# Patient Record
Sex: FEMALE | Race: WHITE | NOT HISPANIC OR LATINO | Employment: FULL TIME | URBAN - METROPOLITAN AREA
[De-identification: names, ages, dates, MRNs, and addresses within clinical notes are randomized per-mention and may not be internally consistent; named-entity substitution may affect disease eponyms.]

---

## 2022-04-10 ENCOUNTER — HOSPITAL ENCOUNTER (EMERGENCY)
Facility: CLINIC | Age: 22
Discharge: HOME | End: 2022-04-10
Attending: EMERGENCY MEDICINE | Admitting: EMERGENCY MEDICINE
Payer: COMMERCIAL

## 2022-04-10 ENCOUNTER — APPOINTMENT (OUTPATIENT)
Dept: GENERAL RADIOLOGY | Facility: CLINIC | Age: 22
End: 2022-04-10
Attending: EMERGENCY MEDICINE
Payer: COMMERCIAL

## 2022-04-10 VITALS
SYSTOLIC BLOOD PRESSURE: 114 MMHG | HEART RATE: 69 BPM | TEMPERATURE: 97.2 F | DIASTOLIC BLOOD PRESSURE: 70 MMHG | RESPIRATION RATE: 16 BRPM | WEIGHT: 140 LBS | OXYGEN SATURATION: 100 %

## 2022-04-10 DIAGNOSIS — S42.021A CLOSED DISPLACED FRACTURE OF SHAFT OF RIGHT CLAVICLE, INITIAL ENCOUNTER: ICD-10-CM

## 2022-04-10 PROCEDURE — 73030 X-RAY EXAM OF SHOULDER: CPT | Mod: RT

## 2022-04-10 PROCEDURE — 23500 CLTX CLAVICULAR FX W/O MNPJ: CPT | Mod: 54 | Performed by: EMERGENCY MEDICINE

## 2022-04-10 PROCEDURE — 99284 EMERGENCY DEPT VISIT MOD MDM: CPT | Mod: 25 | Performed by: EMERGENCY MEDICINE

## 2022-04-10 PROCEDURE — 250N000013 HC RX MED GY IP 250 OP 250 PS 637: Performed by: EMERGENCY MEDICINE

## 2022-04-10 PROCEDURE — 23500 CLTX CLAVICULAR FX W/O MNPJ: CPT | Mod: RT | Performed by: EMERGENCY MEDICINE

## 2022-04-10 RX ORDER — OXYCODONE HYDROCHLORIDE 5 MG/1
5 TABLET ORAL ONCE
Status: COMPLETED | OUTPATIENT
Start: 2022-04-10 | End: 2022-04-10

## 2022-04-10 RX ORDER — ACETAMINOPHEN 325 MG/1
975 TABLET ORAL ONCE
Status: COMPLETED | OUTPATIENT
Start: 2022-04-10 | End: 2022-04-10

## 2022-04-10 RX ORDER — IBUPROFEN 600 MG/1
600 TABLET, FILM COATED ORAL EVERY 8 HOURS PRN
Qty: 20 TABLET | Refills: 0 | Status: SHIPPED | OUTPATIENT
Start: 2022-04-10 | End: 2022-04-12

## 2022-04-10 RX ORDER — OXYCODONE HYDROCHLORIDE 5 MG/1
5 TABLET ORAL EVERY 6 HOURS PRN
Qty: 12 TABLET | Refills: 0 | Status: SHIPPED | OUTPATIENT
Start: 2022-04-10 | End: 2022-04-12

## 2022-04-10 RX ADMIN — OXYCODONE HYDROCHLORIDE 5 MG: 5 TABLET ORAL at 16:26

## 2022-04-10 RX ADMIN — ACETAMINOPHEN 975 MG: 325 TABLET, FILM COATED ORAL at 16:25

## 2022-04-10 ASSESSMENT — ENCOUNTER SYMPTOMS
NECK PAIN: 0
DIFFICULTY URINATING: 0
MYALGIAS: 1
CONFUSION: 0
ABDOMINAL PAIN: 0
ARTHRALGIAS: 1
COLOR CHANGE: 0
HEADACHES: 0
SHORTNESS OF BREATH: 0
EYE REDNESS: 0
NECK STIFFNESS: 0
FEVER: 0

## 2022-04-10 NOTE — ED TRIAGE NOTES
Patient Fell around 2 pm, patient also reports hitting her head, no change in LOC. Patient reports R arm tingling.

## 2022-04-10 NOTE — ED PROVIDER NOTES
ED Provider Note  [facility]      History     Chief Complaint   Patient presents with     Shoulder Injury     Patient roller bladding and fell on her Right Side     The history is provided by the patient and medical records.     Min Anthony is a 21 year old female presenting to the ED via EMS with right shoulder pain. Patient reports she fell onto her right side while roller blading just prior to arrival. She hit her head, was not wearing a helmet, but denies headache. She reports right mid clavicle pain. She is unable to move her right arm. She endorses tingling in the right arm. She took 800 mg ibuprofen prior to arrival.    Past Medical History  History reviewed. No pertinent past medical history.  History reviewed. No pertinent surgical history.  ibuprofen (ADVIL/MOTRIN) 600 MG tablet  oxyCODONE (ROXICODONE) 5 MG tablet      No Known Allergies  Family History  No family history on file.  Social History   Social History     Tobacco Use     Smoking status: Never Smoker     Smokeless tobacco: Never Used   Substance Use Topics     Alcohol use: Yes     Comment: socially     Drug use: Not Currently      Past medical history, past surgical history, medications, allergies, family history, and social history were reviewed with the patient. No additional pertinent items.       Review of Systems   Constitutional: Negative for fever.   HENT: Negative for congestion.    Eyes: Negative for redness.   Respiratory: Negative for shortness of breath.    Cardiovascular: Negative for chest pain.   Gastrointestinal: Negative for abdominal pain.   Genitourinary: Negative for difficulty urinating.   Musculoskeletal: Positive for arthralgias and myalgias. Negative for neck pain and neck stiffness.        Right mid clavicle pain     Skin: Negative for color change.   Neurological: Negative for headaches.   Psychiatric/Behavioral: Negative for confusion.   All other systems reviewed and are negative.    A  complete review of systems was performed with pertinent positives and negatives noted in the HPI, and all other systems negative.    Physical Exam   BP: 114/70  Pulse: 69  Temp: 97.2  F (36.2  C)  Resp: 16  Weight: 63.5 kg (140 lb)  SpO2: 100 %  Physical Exam  Vitals and nursing note reviewed.   Constitutional:       General: She is in acute distress.   HENT:      Head: Atraumatic.   Cardiovascular:      Rate and Rhythm: Normal rate and regular rhythm.   Musculoskeletal:      Right shoulder: Swelling, deformity, tenderness and bony tenderness present. Decreased range of motion.        Arms:       Cervical back: Neck supple.      Comments: Pain swelling and tenderness over the right clavicle, no skin defect.   Neurological:      Mental Status: She is alert.         ED Course      Procedures       XR Shoulder Right 3 Views   Preliminary Result   IMPRESSION:   1.  Acute comminuted fracture of the right clavicle diaphysis (middle third). There is 17 mm of superior displacement of the proximal fragment in relationship to the distal fragment. Moderate-sized vertically orientated butterfly fragment.   2.  No joint malalignment.        Medications   acetaminophen (TYLENOL) tablet 975 mg (975 mg Oral Given 4/10/22 1625)   oxyCODONE (ROXICODONE) tablet 5 mg (5 mg Oral Given 4/10/22 1626)            Results for orders placed or performed during the hospital encounter of 04/10/22   XR Shoulder Right 3 Views     Status: None (Preliminary result)    Narrative    EXAM: XR SHOULDER RIGHT G/E 3 VIEWS  LOCATION: [facility]  DATE/TIME: 4/10/2022 4:42 PM    INDICATION: Right clavicle pain after a fall.  COMPARISON: None.      Impression    IMPRESSION:  1.  Acute comminuted fracture of the right clavicle diaphysis (middle third). There is 17 mm of superior displacement of the proximal fragment in relationship to the distal fragment. Moderate-sized vertically orientated butterfly  fragment.  2.  No joint malalignment.     Medications   acetaminophen (TYLENOL) tablet 975 mg (975 mg Oral Given 4/10/22 1625)   oxyCODONE (ROXICODONE) tablet 5 mg (5 mg Oral Given 4/10/22 1626)        Assessments & Plan (with Medical Decision Making)   21-year-old right-hand-dominant female fell while rollerblading and sustained a displaced and angulated right midshaft clavicle fracture with a free-floating fragment.  There is no nerve or vascular involvement we will place her in a sling provide pain medications and I will give her the number to the [facility] orthopedic clinic to be followed up this week hopefully I think she will need operative repair of this.  It is a closed fracture    I have reviewed the nursing notes. I have reviewed the findings, diagnosis, plan and need for follow up with the patient.    New Prescriptions    IBUPROFEN (ADVIL/MOTRIN) 600 MG TABLET    Take 1 tablet (600 mg) by mouth as needed in the morning and 1 tablet (600 mg) as needed at noon and 1 tablet (600 mg) as needed in the evening for moderate pain.    OXYCODONE (ROXICODONE) 5 MG TABLET    Take 1 tablet (5 mg) by mouth as needed in the morning and 1 tablet (5 mg) as needed at noon and 1 tablet (5 mg) as needed in the evening and 1 tablet (5 mg) as needed before bedtime for pain.       Final diagnoses:   Closed displaced fracture of shaft of right clavicle, initial encounter   I, Matilde Robert, am serving as a trained medical scribe to document services personally performed by Deondre Fishman MD, based on the provider's statements to me.     I, Deondre Fishman MD, was physically present and have reviewed and verified the accuracy of this note documented by Matilde Robert.      --  Deondre Fishman MD  [facility] EMERGENCY DEPARTMENT  4/10/2022     Deondre Fishman MD  04/10/22 1991

## 2022-04-10 NOTE — DISCHARGE INSTRUCTIONS
Use pain medication as needed  Wear the sling for comfort  Call the orthopedic clinic tomorrow to schedule a follow-up this week  Please make an appointment to follow up with Orthopedics Clinic (phone: [phone]) as soon as possible.

## 2022-04-10 NOTE — ED NOTES
Pt is 22 yo F bib self after falling on R shoulder while roller blading. She c/o pain 10/10, numbness and tingling in her fingers, unable to move arm at this time. Pt provided ice pack. MD notified.

## 2022-04-10 NOTE — H&P (VIEW-ONLY)
ED Provider Note  [facility]      History     Chief Complaint   Patient presents with     Shoulder Injury     Patient roller bladding and fell on her Right Side     The history is provided by the patient and medical records.     Min Anthony is a 21 year old female presenting to the ED via EMS with right shoulder pain. Patient reports she fell onto her right side while roller blading just prior to arrival. She hit her head, was not wearing a helmet, but denies headache. She reports right mid clavicle pain. She is unable to move her right arm. She endorses tingling in the right arm. She took 800 mg ibuprofen prior to arrival.    Past Medical History  History reviewed. No pertinent past medical history.  History reviewed. No pertinent surgical history.  ibuprofen (ADVIL/MOTRIN) 600 MG tablet  oxyCODONE (ROXICODONE) 5 MG tablet      No Known Allergies  Family History  No family history on file.  Social History   Social History     Tobacco Use     Smoking status: Never Smoker     Smokeless tobacco: Never Used   Substance Use Topics     Alcohol use: Yes     Comment: socially     Drug use: Not Currently      Past medical history, past surgical history, medications, allergies, family history, and social history were reviewed with the patient. No additional pertinent items.       Review of Systems   Constitutional: Negative for fever.   HENT: Negative for congestion.    Eyes: Negative for redness.   Respiratory: Negative for shortness of breath.    Cardiovascular: Negative for chest pain.   Gastrointestinal: Negative for abdominal pain.   Genitourinary: Negative for difficulty urinating.   Musculoskeletal: Positive for arthralgias and myalgias. Negative for neck pain and neck stiffness.        Right mid clavicle pain     Skin: Negative for color change.   Neurological: Negative for headaches.   Psychiatric/Behavioral: Negative for confusion.   All other systems reviewed and are negative.    A  complete review of systems was performed with pertinent positives and negatives noted in the HPI, and all other systems negative.    Physical Exam   BP: 114/70  Pulse: 69  Temp: 97.2  F (36.2  C)  Resp: 16  Weight: 63.5 kg (140 lb)  SpO2: 100 %  Physical Exam  Vitals and nursing note reviewed.   Constitutional:       General: She is in acute distress.   HENT:      Head: Atraumatic.   Cardiovascular:      Rate and Rhythm: Normal rate and regular rhythm.   Musculoskeletal:      Right shoulder: Swelling, deformity, tenderness and bony tenderness present. Decreased range of motion.        Arms:       Cervical back: Neck supple.      Comments: Pain swelling and tenderness over the right clavicle, no skin defect.   Neurological:      Mental Status: She is alert.         ED Course      Procedures       XR Shoulder Right 3 Views   Preliminary Result   IMPRESSION:   1.  Acute comminuted fracture of the right clavicle diaphysis (middle third). There is 17 mm of superior displacement of the proximal fragment in relationship to the distal fragment. Moderate-sized vertically orientated butterfly fragment.   2.  No joint malalignment.        Medications   acetaminophen (TYLENOL) tablet 975 mg (975 mg Oral Given 4/10/22 1625)   oxyCODONE (ROXICODONE) tablet 5 mg (5 mg Oral Given 4/10/22 1626)            Results for orders placed or performed during the hospital encounter of 04/10/22   XR Shoulder Right 3 Views     Status: None (Preliminary result)    Narrative    EXAM: XR SHOULDER RIGHT G/E 3 VIEWS  LOCATION: [facility]  DATE/TIME: 4/10/2022 4:42 PM    INDICATION: Right clavicle pain after a fall.  COMPARISON: None.      Impression    IMPRESSION:  1.  Acute comminuted fracture of the right clavicle diaphysis (middle third). There is 17 mm of superior displacement of the proximal fragment in relationship to the distal fragment. Moderate-sized vertically orientated butterfly  fragment.  2.  No joint malalignment.     Medications   acetaminophen (TYLENOL) tablet 975 mg (975 mg Oral Given 4/10/22 1625)   oxyCODONE (ROXICODONE) tablet 5 mg (5 mg Oral Given 4/10/22 1626)        Assessments & Plan (with Medical Decision Making)   21-year-old right-hand-dominant female fell while rollerblading and sustained a displaced and angulated right midshaft clavicle fracture with a free-floating fragment.  There is no nerve or vascular involvement we will place her in a sling provide pain medications and I will give her the number to the [facility] orthopedic clinic to be followed up this week hopefully I think she will need operative repair of this.  It is a closed fracture    I have reviewed the nursing notes. I have reviewed the findings, diagnosis, plan and need for follow up with the patient.    New Prescriptions    IBUPROFEN (ADVIL/MOTRIN) 600 MG TABLET    Take 1 tablet (600 mg) by mouth as needed in the morning and 1 tablet (600 mg) as needed at noon and 1 tablet (600 mg) as needed in the evening for moderate pain.    OXYCODONE (ROXICODONE) 5 MG TABLET    Take 1 tablet (5 mg) by mouth as needed in the morning and 1 tablet (5 mg) as needed at noon and 1 tablet (5 mg) as needed in the evening and 1 tablet (5 mg) as needed before bedtime for pain.       Final diagnoses:   Closed displaced fracture of shaft of right clavicle, initial encounter   I, Matilde Robert, am serving as a trained medical scribe to document services personally performed by Deondre Fishman MD, based on the provider's statements to me.     I, Deondre Fishman MD, was physically present and have reviewed and verified the accuracy of this note documented by Matilde Robert.      --  Deondre Fishman MD  [facility] EMERGENCY DEPARTMENT  4/10/2022     Deondre Fishman MD  04/10/22 [id number]

## 2022-04-11 ENCOUNTER — TELEPHONE (OUTPATIENT)
Dept: ORTHOPEDICS | Facility: CLINIC | Age: 22
End: 2022-04-11

## 2022-04-11 ENCOUNTER — OFFICE VISIT (OUTPATIENT)
Dept: ORTHOPEDICS | Facility: CLINIC | Age: 22
End: 2022-04-11
Payer: COMMERCIAL

## 2022-04-11 ENCOUNTER — PRE VISIT (OUTPATIENT)
Dept: ORTHOPEDICS | Facility: CLINIC | Age: 22
End: 2022-04-11

## 2022-04-11 VITALS — WEIGHT: 140 LBS | HEIGHT: 69 IN | BODY MASS INDEX: 20.73 KG/M2

## 2022-04-11 DIAGNOSIS — S42.021A CLOSED DISPLACED FRACTURE OF SHAFT OF RIGHT CLAVICLE, INITIAL ENCOUNTER: Primary | ICD-10-CM

## 2022-04-11 LAB — SARS-COV-2 RNA RESP QL NAA+PROBE: NEGATIVE

## 2022-04-11 PROCEDURE — U0005 INFEC AGEN DETEC AMPLI PROBE: HCPCS | Performed by: ORTHOPAEDIC SURGERY

## 2022-04-11 PROCEDURE — 99204 OFFICE O/P NEW MOD 45 MIN: CPT | Mod: 57 | Performed by: ORTHOPAEDIC SURGERY

## 2022-04-11 PROCEDURE — U0003 INFECTIOUS AGENT DETECTION BY NUCLEIC ACID (DNA OR RNA); SEVERE ACUTE RESPIRATORY SYNDROME CORONAVIRUS 2 (SARS-COV-2) (CORONAVIRUS DISEASE [COVID-19]), AMPLIFIED PROBE TECHNIQUE, MAKING USE OF HIGH THROUGHPUT TECHNOLOGIES AS DESCRIBED BY CMS-2020-01-R: HCPCS | Performed by: ORTHOPAEDIC SURGERY

## 2022-04-11 NOTE — LETTER
4/11/2022         RE: Min Anthony  [id number] [address]  [place] [address]        Dear Colleague,    Thank you for referring your patient, Min Anthony, to the [facility]. Please see a copy of my visit note below.    CHIEF CONCERN:  Right clavicle fracture    HISTORY OF PRESENT ILLNESS:  Min is a 21 year old RHD woman I am seeing for a displaced and comminuted right clavicle fracture. The patient sustained this injury while rollerblading yesterday. She was seen in the ED and XRs reviewed the fracture. She has had tingling/numbness in her R thumb, index and middle finger since the accident. She has had no trouble with motion of those fingers/hand. She had abrasions on her right hand and the edge of her right shoulder. She has had some elbow pain but has been able to move the elbow in the sling.     PAST MEDICAL HISTORY: Reviewed in EMR    Current Outpatient Medications   Medication Sig Dispense Refill     ibuprofen (ADVIL/MOTRIN) 600 MG tablet Take 1 tablet (600 mg) by mouth as needed in the morning and 1 tablet (600 mg) as needed at noon and 1 tablet (600 mg) as needed in the evening for moderate pain. 20 tablet 0     oxyCODONE (ROXICODONE) 5 MG tablet Take 1 tablet (5 mg) by mouth as needed in the morning and 1 tablet (5 mg) as needed at noon and 1 tablet (5 mg) as needed in the evening and 1 tablet (5 mg) as needed before bedtime for pain. 12 tablet 0        No Known Allergies    SOCIAL HISTORY:    Social History     Socioeconomic History     Marital status: Single     Spouse name: Not on file     Number of children: Not on file     Years of education: Not on file     Highest education level: Not on file   Occupational History     Not on file   Tobacco Use     Smoking status: Never Smoker     Smokeless tobacco: Never Used   Substance and Sexual Activity     Alcohol use: Yes     Comment: socially     Drug use: Not Currently     Sexual activity: Yes     Partners: Male      Birth control/protection: I.U.D.   Other Topics Concern     Not on file   Social History Narrative     Not on file     Social Determinants of Health     Financial Resource Strain: Not on file   Food Insecurity: Not on file   Transportation Needs: Not on file   Physical Activity: Not on file   Stress: Not on file   Social Connections: Not on file   Intimate Partner Violence: Not on file   Housing Stability: Not on file       FAMILY HISTORY: Reviewed in EMR      REVIEW OF SYSTEMS: Positive for that noted in past medical history and history of present illness and otherwise reviewed in EMR     PHYSICAL EXAM:    Adult female in no acute distress. Articulates and communicates with normal affect.  Respirations even and unlabored  Focused upper extremity exam: She has an abrasion measuring 2cm2cm over the posterior-lateral point of the shoulder. There is no erythema. This is not over the clavicle. She is quite limited by pain and shoulder ROM is not tested. ROM of the remainder of the arm (hand, wrist, elbow) is limited by pain. She describes chuyita numbness in the index and long finger and to some extent the thumb. Hand is warm and well perfused.     IMAGING:  Right shoulder XRs from yesterday demonstrate a midshaft displaced clavicle fracture with comminution.     ASSESSMENT:    1. Right midshaft clavicle fracture, with complete displacement and comminution.  2. 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 I discussed the risks of nonoperative management including malunion, nonunion, and the cosmetic change with a bump at the fracture site. I also discussed risks of operative management including nonunion, infection, bleeding, risk to nerves/vessels, the presence of a scar, and hardware irritation. I advised the patient that this fracture pattern is one in which if treated non-operatively, the  rate of nonunion and potentially overhead fatigability is higher than in a less displaced or comminuted fracture pattern. At this time, the patient has considered the options and strongly favors operative management. We will arrange for a surgical date.  I reviewed that given her neuropathy this is something we would need to watch and I will inform the Anesthesia team if this in fact would change their plan for her regional surgical block.      Amada Luna MD        Again, thank you for allowing me to participate in the care of your patient.        Sincerely,        Amada Luna MD

## 2022-04-11 NOTE — TELEPHONE ENCOUNTER
Date Scheduled: 4-12-22  Facility: [facility]  Surgeon: Dr. Luna   Post-op appointment scheduled: [facility] 4-20-22   scheduled?: No  Surgery packet/instructions confirmed received?  Yes  Special Considerations:   Chantel Flores, Surgery Scheduling Coordinator

## 2022-04-11 NOTE — TELEPHONE ENCOUNTER
Procedure: Open reduction internal fixation (ORIF) right clavicle fracture  Facility: [facility]  Length: 90 minutes  Anesthesia: Choice, Interscalene Block  Post-op appointments needed: 1 weeks provider only, 6 weeks with provider only.  Surgery packet/instructions given to patient?  Yes     Tahir Becerra RN

## 2022-04-11 NOTE — NURSING NOTE
Pre-Operative Teaching Flowsheet     Person(s) involved in teaching: Patient     Motivation Level:  Receptive (willing/able to accept information) and asks appropriate questions where applicable: Yes  Any cultural factors/Taoist beliefs that may influence understanding or compliance? No     Patient demonstrates understanding of the following:  Pre-operative planning, including the necessary appointments and preparation needed prior to surgery: Yes  Which situations necessitate calling provider and whom to contact: Yes  Pain management techniques pre and post op: Yes  How, and when, to access community resources: Yes    Additional Teaching Concerns Addressed:  Post-operative living arrangements and necessary adaptations to living environment.     Instructional Materials Used/Given: Yes, pre-op packet printed from AVS with additional system forms added (COVID Testing Handout, Map, etc). Pre-op soap given (if in clinic).     Time spent with patient: 20 minutes.    Tahir Becerra RN

## 2022-04-11 NOTE — TELEPHONE ENCOUNTER
Orthopedic/Sports Medicine Fracture Triage    Incoming call/or message from call center member.    Fracture type: Clavicle.    The patient is in a  sling    Date of injury 4/10/22    Triaged by: Dr. Maldonado, And Review by Dr. Luna for same day add on .    Determined to be managed Surgically.    Needs to be seen Today due to Shoulder clinic availability     Additional Comments/information:     Angeles Reina LPN

## 2022-04-12 ENCOUNTER — HOSPITAL ENCOUNTER (OUTPATIENT)
Facility: AMBULATORY SURGERY CENTER | Age: 22
Discharge: HOME | End: 2022-04-12
Attending: ORTHOPAEDIC SURGERY
Payer: COMMERCIAL

## 2022-04-12 ENCOUNTER — ANESTHESIA (OUTPATIENT)
Dept: SURGERY | Facility: AMBULATORY SURGERY CENTER | Age: 22
End: 2022-04-12
Payer: COMMERCIAL

## 2022-04-12 ENCOUNTER — ANESTHESIA EVENT (OUTPATIENT)
Dept: SURGERY | Facility: AMBULATORY SURGERY CENTER | Age: 22
End: 2022-04-12
Payer: COMMERCIAL

## 2022-04-12 VITALS
WEIGHT: 140 LBS | DIASTOLIC BLOOD PRESSURE: 62 MMHG | BODY MASS INDEX: 20.73 KG/M2 | RESPIRATION RATE: 12 BRPM | HEART RATE: 74 BPM | SYSTOLIC BLOOD PRESSURE: 108 MMHG | OXYGEN SATURATION: 98 % | HEIGHT: 69 IN | TEMPERATURE: 97.3 F

## 2022-04-12 DIAGNOSIS — S42.021A CLOSED DISPLACED FRACTURE OF SHAFT OF RIGHT CLAVICLE, INITIAL ENCOUNTER: ICD-10-CM

## 2022-04-12 LAB
HCG UR QL: NEGATIVE
INTERNAL QC OK POCT: NORMAL
POCT KIT EXPIRATION DATE: NORMAL
POCT KIT LOT NUMBER: NORMAL

## 2022-04-12 PROCEDURE — C1713 ANCHOR/SCREW BN/BN,TIS/BN: HCPCS | Mod: RT

## 2022-04-12 PROCEDURE — 81025 URINE PREGNANCY TEST: CPT | Performed by: PATHOLOGY

## 2022-04-12 PROCEDURE — 23515 OPTX CLAVICULAR FX W/INT FIX: CPT | Mod: RT | Performed by: ORTHOPAEDIC SURGERY

## 2022-04-12 PROCEDURE — 23515 OPTX CLAVICULAR FX W/INT FIX: CPT | Mod: RT

## 2022-04-12 DEVICE — SCREW NON-LOCKING T10 3.5X16MM: Type: IMPLANTABLE DEVICE | Site: CLAVICLE | Status: FUNCTIONAL

## 2022-04-12 DEVICE — IMP BONE SCR T10 FT 3.5MM / L14MM 657414: Type: IMPLANTABLE DEVICE | Site: CLAVICLE | Status: FUNCTIONAL

## 2022-04-12 DEVICE — IMPLANTABLE DEVICE: Type: IMPLANTABLE DEVICE | Site: CLAVICLE | Status: FUNCTIONAL

## 2022-04-12 DEVICE — IMP SCR BONE 3.5MM 12MML T10 HEX DRIVE FT 657412: Type: IMPLANTABLE DEVICE | Site: CLAVICLE | Status: FUNCTIONAL

## 2022-04-12 RX ORDER — SODIUM CHLORIDE, SODIUM LACTATE, POTASSIUM CHLORIDE, CALCIUM CHLORIDE 600; 310; 30; 20 MG/100ML; MG/100ML; MG/100ML; MG/100ML
INJECTION, SOLUTION INTRAVENOUS CONTINUOUS PRN
Status: DISCONTINUED | OUTPATIENT
Start: 2022-04-12 | End: 2022-04-12

## 2022-04-12 RX ORDER — OXYCODONE HYDROCHLORIDE 5 MG/1
5-10 TABLET ORAL EVERY 4 HOURS PRN
Qty: 20 TABLET | Refills: 0 | Status: SHIPPED | OUTPATIENT
Start: 2022-04-12 | End: 2022-04-12

## 2022-04-12 RX ORDER — NALOXONE HYDROCHLORIDE 0.4 MG/ML
0.4 INJECTION, SOLUTION INTRAMUSCULAR; INTRAVENOUS; SUBCUTANEOUS
Status: DISCONTINUED | OUTPATIENT
Start: 2022-04-12 | End: 2022-04-13 | Stop reason: HOSPADM

## 2022-04-12 RX ORDER — OXYCODONE HYDROCHLORIDE 5 MG/1
5 TABLET ORAL
Status: DISCONTINUED | OUTPATIENT
Start: 2022-04-12 | End: 2022-04-13 | Stop reason: HOSPADM

## 2022-04-12 RX ORDER — ACETAMINOPHEN 325 MG/1
650 TABLET ORAL
Status: DISCONTINUED | OUTPATIENT
Start: 2022-04-12 | End: 2022-04-13 | Stop reason: HOSPADM

## 2022-04-12 RX ORDER — PROPOFOL 10 MG/ML
INJECTION, EMULSION INTRAVENOUS PRN
Status: DISCONTINUED | OUTPATIENT
Start: 2022-04-12 | End: 2022-04-12

## 2022-04-12 RX ORDER — DEXAMETHASONE SODIUM PHOSPHATE 4 MG/ML
INJECTION, SOLUTION INTRA-ARTICULAR; INTRALESIONAL; INTRAMUSCULAR; INTRAVENOUS; SOFT TISSUE PRN
Status: DISCONTINUED | OUTPATIENT
Start: 2022-04-12 | End: 2022-04-12

## 2022-04-12 RX ORDER — IBUPROFEN 600 MG/1
600 TABLET, FILM COATED ORAL EVERY 6 HOURS PRN
Qty: 40 TABLET | Refills: 0 | Status: SHIPPED | OUTPATIENT
Start: 2022-04-12 | End: 2023-07-03

## 2022-04-12 RX ORDER — ONDANSETRON 2 MG/ML
INJECTION INTRAMUSCULAR; INTRAVENOUS PRN
Status: DISCONTINUED | OUTPATIENT
Start: 2022-04-12 | End: 2022-04-12

## 2022-04-12 RX ORDER — BUPIVACAINE HYDROCHLORIDE AND EPINEPHRINE 5; 5 MG/ML; UG/ML
INJECTION, SOLUTION PERINEURAL
Status: COMPLETED | OUTPATIENT
Start: 2022-04-12 | End: 2022-04-12

## 2022-04-12 RX ORDER — LIDOCAINE 40 MG/G
CREAM TOPICAL
Status: DISCONTINUED | OUTPATIENT
Start: 2022-04-12 | End: 2022-04-12 | Stop reason: HOSPADM

## 2022-04-12 RX ORDER — PROPOFOL 10 MG/ML
INJECTION, EMULSION INTRAVENOUS CONTINUOUS PRN
Status: DISCONTINUED | OUTPATIENT
Start: 2022-04-12 | End: 2022-04-12

## 2022-04-12 RX ORDER — FENTANYL CITRATE 50 UG/ML
25-50 INJECTION, SOLUTION INTRAMUSCULAR; INTRAVENOUS
Status: DISCONTINUED | OUTPATIENT
Start: 2022-04-12 | End: 2022-04-12 | Stop reason: HOSPADM

## 2022-04-12 RX ORDER — SODIUM CHLORIDE, SODIUM LACTATE, POTASSIUM CHLORIDE, CALCIUM CHLORIDE 600; 310; 30; 20 MG/100ML; MG/100ML; MG/100ML; MG/100ML
INJECTION, SOLUTION INTRAVENOUS CONTINUOUS
Status: DISCONTINUED | OUTPATIENT
Start: 2022-04-12 | End: 2022-04-12 | Stop reason: HOSPADM

## 2022-04-12 RX ORDER — ACETAMINOPHEN 325 MG/1
650 TABLET ORAL EVERY 4 HOURS PRN
Qty: 50 TABLET | Refills: 0 | Status: SHIPPED | OUTPATIENT
Start: 2022-04-12 | End: 2023-07-03

## 2022-04-12 RX ORDER — NALOXONE HYDROCHLORIDE 0.4 MG/ML
0.2 INJECTION, SOLUTION INTRAMUSCULAR; INTRAVENOUS; SUBCUTANEOUS
Status: DISCONTINUED | OUTPATIENT
Start: 2022-04-12 | End: 2022-04-13 | Stop reason: HOSPADM

## 2022-04-12 RX ORDER — CEFAZOLIN SODIUM 2 G/50ML
2 SOLUTION INTRAVENOUS SEE ADMIN INSTRUCTIONS
Status: DISCONTINUED | OUTPATIENT
Start: 2022-04-12 | End: 2022-04-12 | Stop reason: HOSPADM

## 2022-04-12 RX ORDER — CEFAZOLIN SODIUM 500 MG/2.2ML
INJECTION, POWDER, FOR SOLUTION INTRAMUSCULAR; INTRAVENOUS PRN
Status: DISCONTINUED | OUTPATIENT
Start: 2022-04-12 | End: 2022-04-12

## 2022-04-12 RX ORDER — LIDOCAINE HYDROCHLORIDE 20 MG/ML
INJECTION, SOLUTION INFILTRATION; PERINEURAL PRN
Status: DISCONTINUED | OUTPATIENT
Start: 2022-04-12 | End: 2022-04-12

## 2022-04-12 RX ORDER — CEFAZOLIN SODIUM 2 G/50ML
2 SOLUTION INTRAVENOUS
Status: DISCONTINUED | OUTPATIENT
Start: 2022-04-12 | End: 2022-04-12 | Stop reason: HOSPADM

## 2022-04-12 RX ORDER — BUPIVACAINE HYDROCHLORIDE 2.5 MG/ML
INJECTION, SOLUTION INFILTRATION; PERINEURAL PRN
Status: DISCONTINUED | OUTPATIENT
Start: 2022-04-12 | End: 2022-04-12 | Stop reason: HOSPADM

## 2022-04-12 RX ORDER — ONDANSETRON 4 MG/1
4 TABLET, ORALLY DISINTEGRATING ORAL
Status: DISCONTINUED | OUTPATIENT
Start: 2022-04-12 | End: 2022-04-13 | Stop reason: HOSPADM

## 2022-04-12 RX ORDER — AMOXICILLIN 250 MG
1-2 CAPSULE ORAL 2 TIMES DAILY
Qty: 30 TABLET | Refills: 0 | Status: SHIPPED | OUTPATIENT
Start: 2022-04-12 | End: 2023-07-03

## 2022-04-12 RX ORDER — FLUMAZENIL 0.1 MG/ML
0.2 INJECTION, SOLUTION INTRAVENOUS
Status: DISCONTINUED | OUTPATIENT
Start: 2022-04-12 | End: 2022-04-12 | Stop reason: HOSPADM

## 2022-04-12 RX ORDER — FENTANYL CITRATE 50 UG/ML
INJECTION, SOLUTION INTRAMUSCULAR; INTRAVENOUS PRN
Status: DISCONTINUED | OUTPATIENT
Start: 2022-04-12 | End: 2022-04-12

## 2022-04-12 RX ADMIN — PROPOFOL 100 MG: 10 INJECTION, EMULSION INTRAVENOUS at 12:49

## 2022-04-12 RX ADMIN — ONDANSETRON 4 MG: 2 INJECTION INTRAMUSCULAR; INTRAVENOUS at 12:40

## 2022-04-12 RX ADMIN — FENTANYL CITRATE 50 MCG: 50 INJECTION, SOLUTION INTRAMUSCULAR; INTRAVENOUS at 13:25

## 2022-04-12 RX ADMIN — DEXAMETHASONE SODIUM PHOSPHATE 4 MG: 4 INJECTION, SOLUTION INTRA-ARTICULAR; INTRALESIONAL; INTRAMUSCULAR; INTRAVENOUS; SOFT TISSUE at 12:40

## 2022-04-12 RX ADMIN — SODIUM CHLORIDE, SODIUM LACTATE, POTASSIUM CHLORIDE, CALCIUM CHLORIDE: 600; 310; 30; 20 INJECTION, SOLUTION INTRAVENOUS at 12:19

## 2022-04-12 RX ADMIN — BUPIVACAINE HYDROCHLORIDE AND EPINEPHRINE 10 ML: 5; 5 INJECTION, SOLUTION PERINEURAL at 12:18

## 2022-04-12 RX ADMIN — PROPOFOL 150 MCG/KG/MIN: 10 INJECTION, EMULSION INTRAVENOUS at 12:40

## 2022-04-12 RX ADMIN — PROPOFOL 200 MG: 10 INJECTION, EMULSION INTRAVENOUS at 12:40

## 2022-04-12 RX ADMIN — LIDOCAINE HYDROCHLORIDE 80 MG: 20 INJECTION, SOLUTION INFILTRATION; PERINEURAL at 12:40

## 2022-04-12 RX ADMIN — CEFAZOLIN SODIUM 2000 MG: 500 INJECTION, POWDER, FOR SOLUTION INTRAMUSCULAR; INTRAVENOUS at 12:31

## 2022-04-12 RX ADMIN — SODIUM CHLORIDE, SODIUM LACTATE, POTASSIUM CHLORIDE, CALCIUM CHLORIDE: 600; 310; 30; 20 INJECTION, SOLUTION INTRAVENOUS at 12:23

## 2022-04-12 NOTE — BRIEF OP NOTE
[facility]    Brief Operative Note    Pre-operative diagnosis: Closed displaced fracture of shaft of right clavicle, initial encounter [S42.021A]  Post-operative diagnosis Same as pre-operative diagnosis    Procedure: Procedure(s):  OPEN REDUCTION INTERNAL FIXATION, FRACTURE, CLAVICLE RIGHT  Surgeon: Surgeon(s) and Role:     * Amada Luna MD - Primary     * Farhat Saleem MD - Resident - Assisting  Anesthesia: Combined General with Block   Estimated Blood Loss: 25 mL from 4/12/2022 12:34 PM to 4/12/2022  2:20 PM      Drains: None  Specimens: * No specimens in log *  Findings:   Please see op note.  Complications: None.  Implants:   Implant Name Type Inv. Item Serial No.  Lot No. LRB No. Used Action   Right superior midshaft plate-decreased curvature    [employer]  Right 1 Implanted   IMP WIRE KIRSTEN STRK 1.6MM 150MM [id number] - [id number] Wire IMP WIRE KIRSTEN STRK 1.6MM 150MM [id number]  [employer] [facility]  Right 2 Implanted and Explanted   IMP SCR BONE 3.5MM 12MML T10 HEX DRIVE FT [id number] - [id number] Metallic Hardware/[employer] IMP SCR BONE 3.5MM 12MML T10 HEX DRIVE FT [id number]  [employer]  Right 2 Implanted   IMP SCR BONE 3.5MM 12MML T10 HEX DRIVE FT [id number] - [id number] Metallic Hardware/[employer] IMP SCR BONE 3.5MM 12MML T10 HEX DRIVE FT [id number]  [employer]  Right 2 Wasted   IMP BONE SCR T10 FT 3.5MM / L14MM [id number] - [id number] Metallic Hardware/[employer] IMP BONE SCR T10 FT 3.5MM / L14MM [id number]  [employer]  Right 3 Implanted   SCREW NON-LOCKING T10 3.5X16MM - [id number] Metallic Hardware/[employer] SCREW NON-LOCKING T10 3.5X16MM  [employer]  Right 1 Implanted   [place] 2.7mm Non-locking screw 10mm    [employer]  Right 1 Wasted   IMP BONE SCREW T10 FT 2.7MM / L14MM [id number] - [id number] Metallic Hardware/[employer] IMP BONE SCREW T10 FT 2.7MM / L14MM [id number]  [employer]  Right 1 Implanted   IMP BONE SCR T10 FT 2.7MM /  L16MM [id number] - [id number] Metallic Hardware/[place] IMP BONE SCR T10 FT 2.7MM / L16MM [id number]  [employer]  Right 1 Implanted       Farhat Saleem MD  Orthopaedic Surgery, PGY-4

## 2022-04-12 NOTE — ANESTHESIA CARE TRANSFER NOTE
Patient: Min Anthony    Procedure: Procedure(s):  OPEN REDUCTION INTERNAL FIXATION, FRACTURE, CLAVICLE RIGHT       Diagnosis: Closed displaced fracture of shaft of right clavicle, initial encounter [S42.021A]  Diagnosis Additional Information: No value filed.    Anesthesia Type:   General     Note:    Oropharynx: oropharynx clear of all foreign objects and spontaneously breathing  Level of Consciousness: awake  Oxygen Supplementation: room air    Independent Airway: airway patency satisfactory and stable  Dentition: dentition unchanged      Patient transferred to: PACU  Comments: Uneventful transport   Report to RN  - Jennifer N  Pt comfortable  Exchanging well; color natl  Pt responds appropriately to command  IV patent  Lips/teeth/dentition as preop status  Questions answered    Handoff Report: Identifed the Patient, Identified the Reponsible Provider, Reviewed the pertinent medical history, Discussed the surgical course, Reviewed Intra-OP anesthesia mangement and issues during anesthesia, Set expectations for post-procedure period and Allowed opportunity for questions and acknowledgement of understanding      Vitals:  Vitals Value Taken Time   /64 1424   Temp 97.3    Pulse 80 nsr    Resp  16    SpO2 97-98% room air        Electronically Signed By: GABI MORALES CRNA  April 12, 2022  2:27 PM

## 2022-04-12 NOTE — ANESTHESIA PREPROCEDURE EVALUATION
Anesthesia Pre-Procedure Evaluation    Patient: Min Anthony   MRN: [id number] : 2000        Procedure : Procedure(s):  OPEN REDUCTION INTERNAL FIXATION, FRACTURE, CLAVICLE          No past medical history on file.   History reviewed. No pertinent surgical history.   No Known Allergies   Social History     Tobacco Use     Smoking status: Never Smoker     Smokeless tobacco: Never Used   Substance Use Topics     Alcohol use: Yes     Comment: socially      Wt Readings from Last 1 Encounters:   22 63.5 kg (140 lb)        Anesthesia Evaluation   Pt has had prior anesthetic. Type: General.        ROS/MED HX  ENT/Pulmonary:  - neg pulmonary ROS     Neurologic:  - neg neurologic ROS     Cardiovascular:  - neg cardiovascular ROS     METS/Exercise Tolerance:     Hematologic:  - neg hematologic  ROS     Musculoskeletal:  - neg musculoskeletal ROS     GI/Hepatic:  - neg GI/hepatic ROS     Renal/Genitourinary:  - neg Renal ROS     Endo:  - neg endo ROS     Psychiatric/Substance Use:  - neg psychiatric ROS     Infectious Disease:  - neg infectious disease ROS     Malignancy:       Other:            Physical Exam    Airway  airway exam normal           Respiratory Devices and Support         Dental  no notable dental history         Cardiovascular   cardiovascular exam normal          Pulmonary   pulmonary exam normal                OUTSIDE LABS:  CBC: No results found for: WBC, HGB, HCT, PLT  BMP: No results found for: NA, POTASSIUM, CHLORIDE, CO2, BUN, CR, GLC  COAGS: No results found for: PTT, INR, FIBR  POC:   Lab Results   Component Value Date    HCG Negative 2022     HEPATIC: No results found for: ALBUMIN, PROTTOTAL, ALT, AST, GGT, ALKPHOS, BILITOTAL, BILIDIRECT, CECILIA  OTHER: No results found for: PH, LACT, A1C, JODI, PHOS, MAG, LIPASE, AMYLASE, TSH, T4, T3, CRP, SED    Anesthesia Plan    ASA Status:  1   NPO Status:  NPO Appropriate    Anesthesia Type: General.     - Airway: LMA   Induction:  Intravenous.   Maintenance: TIVA.        Consents    Anesthesia Plan(s) and associated risks, benefits, and realistic alternatives discussed. Questions answered and patient/representative(s) expressed understanding.     - Discussed: Risks, Benefits and Alternatives for BOTH SEDATION and the PROCEDURE were discussed     - Discussed with:  Patient         Postoperative Care    Pain management: Oral pain medications, Peripheral nerve block (Single Shot).   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     Patient reports numbness and tingling in first, second, and half of her third finger on the right since injury.  Discussion re risks and benefits including nerve injury, agreed to proceed.             Sid Najera MD, MD

## 2022-04-12 NOTE — PROGRESS NOTES
Patient received right side Interscalene nerve block  with Exparel.  Versed 2mg given. Tolerated procedure well.

## 2022-04-12 NOTE — DISCHARGE INSTRUCTIONS
[facility]  Home Care Following Anesthesia  For 24 hours after surgery:  Get plenty of rest.  A responsible adult must stay with you for at least 24 hours after you leave the surgery center.  Do not drive or use heavy equipment.  If you have weakness or tingling, don't drive or use heavy equipment until this feeling goes away.   Do not drink alcohol.   Avoid strenuous or risky activities.  Ask for help when climbing stairs.  You may feel lightheaded.  IF so, sit for a few minutes before standing.  Have someone help you get up.   If you have nausea (feel sick to your stomach): Drink only clear liquids such as apple juice, ginger ale, broth or 7-Up.  Rest may also help.  Be sure to drink enough fluids.  Move to a regular diet as you feel able.   You may have a slight fever.  Call the doctor if your fever is over 100 F (37.7 C) (taken under the tongue) or lasts longer than 24 hours.  You may have a dry mouth, a sore throat, muscle aches or trouble sleeping. These should go away after 24 hours.  Do not make important or legal decisions.   It is recommended to avoid smoking.               Tips for taking pain medications  To get the best pain relief possible, remember these points:  Take pain medications as directed, before pain becomes severe.  Pain medication can upset your stomach: taking it with food may help.  Constipation is a common side effect of pain medication. Drink plenty of  fluids.  Eat foods high in fiber. Take a stool softener if recommended by your doctor or pharmacist.  Do not drink alcohol, drive or operate machinery while taking pain medications.  Ask about other ways to control pain, such as with heat, ice or relaxation.    Tylenol/Acetaminophen Consumption  To help encourage the safe use of acetaminophen, the makers of TYLENOL  have lowered the maximum daily dose for single-ingredient Extra Strength TYLENOL  (acetaminophen) products sold in the [place] from 8 pills  per day (4,000 mg) to 6 pills per day (3,000 mg). The dosing interval has also changed from 2 pills every 4-6 hours to 2 pills every 6 hours.  If you feel your pain relief is insufficient, you may take Tylenol/Acetaminophen in addition to your narcotic pain medication.   Be careful not to exceed 3,000 mg of Tylenol/Acetaminophen in a 24 hour period from all sources.  If you are taking extra strength Tylenol/acetaminophen (500 mg), the maximum dose is 6 tablets in 24 hours.  If you are taking regular strength acetaminophen (325 mg), the maximum dose is 9 tablets in 24 hours.    Call a doctor for any of the following:  Signs of infection (fever, growing tenderness at the surgery site, a large amount of drainage or bleeding, severe pain, foul-smelling drainage, redness, swelling).  It has been over 8 to 10 hours since surgery and you are still not able to urinate (pass water).  Headache for over 24 hours.  Numbness, tingling or weakness the day after surgery (if you had spinal anesthesia).  Signs of Covid-19 infection (temperature over 100 degrees, shortness of breath, cough, loss of taste/smell, generalized body aches, persistent headache, chills, sore throat, nausea/vomiting/diarrhea)  Your doctor is:  Dr. Amada Luna, Orthopaedics: [phone]                    Or dial [phone] and ask for the resident on call for:  Orthopaedics  For emergency care, call the:  [facility]: [phone] (TTY for hearing impaired: [phone])  Post Operative Instructions: Regional Anesthetic for Upper Extremity    General Information:   Regional anesthesia is when local anesthetic or  numbing  medication is injected around the nerves to anesthetize or  numb  the area supplied by that set of nerves.      Types of Regional Blocks:  Interscalene: A block injected into the neck on the operative shoulder/arm of a patient having shoulder surgery  Supraclavicular: A block injected near the clavicle on the  operative shoulder of a patient having elbow, forearm, or hand surgery    Procedure:  The type of anesthesia your doctor used to numb your shoulder or arm will usually not wear off for 6-18 hours, but may last as long as 24 hours. You should be careful during that period, since it is possible to injure your arm without being aware of the injury. While your arm is numb, you should:  Avoid striking or bumping your arm  Avoid extreme hot or cold    Diet:  There are no restrictions on your diet. You should drink plenty of fluids.     Discomfort:  You will have a tingling and prickly sensation in your arm as the feeling begins to return. You can also expect some discomfort. The amount of discomfort is unpredictable, but if you have more pain than can be controlled with pain medication you should notify your physician.     Pain Medicine:   Begin taking your oral pain pills (if you have not already done so) before bedtime and during the night to avoid a sudden onset of pain as the block wears off.  Do not engage in drinking, driving, or hazardous occupations while taking pain medication.     Stitches:   You may have stitches or special skin closures. You doctor will inform you when to return to the office to have them removed.     Activity:  On the day of surgery you should try to stay in bed with your hand elevated on pillows. You may resume your normal activity after that, wearing a sling for comfort. Contact your physician if you have any of the problems:   Continued numbness or tingling in the arm or hand after 24 hours  Swelling of the fingers or fingers that are cold to the touch  Excessive bleeding or drainage  Severe pain

## 2022-04-12 NOTE — ANESTHESIA PROCEDURE NOTES
Brachial plexus Procedure Note    Pre-Procedure   Staff -        Performed By: anesthesiologist       Location: pre-op       Procedure Start/Stop Times: 4/12/2022 12:18 PM and 4/12/2022 12:28 PM       Pre-Anesthestic Checklist: patient identified, IV checked, site marked, risks and benefits discussed, informed consent, monitors and equipment checked, pre-op evaluation, at physician/surgeon's request and post-op pain management  Timeout:       Correct Patient: Yes        Correct Procedure: Yes        Correct Site: Yes        Correct Position: Yes        Correct Laterality: Yes        Site Marked: Yes  Procedure Documentation  Procedure: Brachial plexus       Laterality: right       Patient Position: sitting       Patient Prep/Sterile Barriers: sterile gloves, mask       Skin prep: Chloraprep (interscalene approach).       Needle Type: insulated and short bevel       Needle Gauge: 21.        Ultrasound guided       1. Ultrasound was used to identify targeted nerve, plexus, vascular marker, or fascial plane and place a needle adjacent to it in real-time.       2. Ultrasound was used to visualize the spread of anesthetic in close proximity to the above referenced structure.       3. A permanent image is entered into the patient's record.       4. The visualized anatomic structures appeared normal.       5. There were no apparent abnormal pathologic findings.    Assessment/Narrative         The placement was negative for: blood aspirated, painful injection and site bleeding       Paresthesias: No.       Bolus given via needle..        Secured via.        Insertion/Infusion Method: Single Shot       Complications: none    Medication(s) Administered   Bupivacaine 0.5% w/ 1:200K Epi (Other) - Other   10 mL - 4/12/2022 12:18:00 PM  Bupivacaine liposome (Exparel) 1.3% LA inj susp (Infiltration) - Infiltration   10 mL - 4/12/2022 12:18:00 PM  Medication Administration Time: 4/12/2022 12:18 PM     Comments:  133 mg exparel  given via interscalene block.    Block performed without complication. No paresthesias.

## 2022-04-12 NOTE — ANESTHESIA POSTPROCEDURE EVALUATION
Patient: Min Anthony    Procedure: Procedure(s):  OPEN REDUCTION INTERNAL FIXATION, FRACTURE, CLAVICLE RIGHT       Anesthesia Type:  General    Note:  Disposition: Outpatient   Postop Pain Control: Uneventful            Sign Out: Well controlled pain   PONV:    Neuro/Psych: Uneventful            Sign Out: Acceptable/Baseline neuro status   Airway/Respiratory: Uneventful            Sign Out: Acceptable/Baseline resp. status   CV/Hemodynamics: Uneventful            Sign Out: Acceptable CV status; No obvious hypovolemia; No obvious fluid overload   Other NRE:    DID A NON-ROUTINE EVENT OCCUR?            Last vitals:  Vitals Value Taken Time   /60 04/12/22 1430   Temp 36.3  C (97.3  F) 04/12/22 1430   Pulse 74 04/12/22 1430   Resp 12 04/12/22 1430   SpO2 97 % 04/12/22 1430       Electronically Signed By: Tariq Rubi MD  April 12, 2022  3:41 PM

## 2022-04-13 ENCOUNTER — TELEPHONE (OUTPATIENT)
Dept: ORTHOPEDICS | Facility: CLINIC | Age: 22
End: 2022-04-13
Payer: COMMERCIAL

## 2022-04-13 NOTE — TELEPHONE ENCOUNTER
Return phone call to patient's mother Anneliese and provided her with our release of information phone number.  She thanked the writer and had no further questions.    Noman Perla, EMT on 4/13/2022 at 9:47 AM

## 2022-04-13 NOTE — TELEPHONE ENCOUNTER
[facility] Call Center    Phone Message    May a detailed message be left on voicemail: yes     Reason for Call: Other: Patient's mother is requesting a copy of xray post op. She is asking for it to be sent to her e-mail address -   [email or web address]     Action Taken: Message routed to:  [facility]    Travel Screening: Not Applicable

## 2022-04-13 NOTE — TELEPHONE ENCOUNTER
S/p Open reduction internal fixation (ORIF) right clavicle fracture, DOS: 4/10/22  Called pharmacy back and confirmed this change was ok.    Tahir Becerra RN

## 2022-04-13 NOTE — TELEPHONE ENCOUNTER
"[facility] Call Center    Phone Message    May a detailed message be left on voicemail: no     Reason for Call: Medication Question or concern regarding medication   Prescription Clarification  Name of Medication: Oxycodone  Prescribing Provider: Dr Luna   Pharmacy: [facility]   What on the order needs clarification? Prescription needs to state \"max of 6 tablets per day\"          Action Taken: Message routed to:  [facility]    Travel Screening: Not Applicable                                                                        "

## 2022-04-17 NOTE — OP NOTE
DATE OF SURGERY: 4/12/22    PREOPERATIVE DIAGNOSIS:   1. Right mid shaft clavicle fracture with comminution and shortening.     POSTOPERATIVE DIAGNOSIS:  1. Right mid shaft clavicle fracture with comminution and shortening.     PROCEDURE: Open reduction internal fixation right clavicle fracture.     STAFF SURGEON: Amada Luna MD.   ASSISTANT: Farhat Saleem    ANESTHESIA: General endotracheal anesthesia with supplementary regional block.   ESTIMATED BLOOD LOSS: 25 mL.     IMPLANTS. Elcho decreased curvature mid shaft superior clavicle plate with 6 screws+2 lag screws  COMPLICATIONS: None    BRIEF PATIENT HISTORY: Min is a 21-year-old woman who sustained a mid shaft clavicle fracture in a traumatic fall while rollerblading on 4/10/22. The patient was seen in the emergency department where radiographs demonstrated a displaced, comminuted clavicle fracture. I discussed with the patient and the risks and benefits of both nonoperative and operative treatment options. The patient had the opportunity for questions to be answered and wished to proceed to surgery. Informed consent was completed.     DESCRIPTION OF PROCEDURE: The patient was identified in the preoperative area and the correct right shoulder was marked for surgery. The patient was provided a regional block by our anesthesia colleagues and was taken to the operating room where she was surrendered to general endotracheal anesthesia. The patient was positioned in the beachchair position with all bony prominences well padded. The head was placed in a head reyez with the neck in neutral position. The right upper extremity was prepped and draped in the usual sterile fashion. A timeout was held in accordance with hospital policy, confirming correct patient, side, site, procedure and administration of IV antibiotics prior to incision. I completed a longitudinal incision centered over the fracture site. Full-thickness flaps were taken down to the  fracture site and bone ends were freed from one another. The butterfly fragment was buried in tissue inferior to the clavicle and was removed with great care. No bleeding was encountered upon freeing that segment. The segment was affixed to the medial and lateral segments with lag screws. A superior plate was selected and placed in the appropriate position. Screws were placed, 3 medial and 3 lateral with appropriate fracture reduction and hardware position confirmed on radiographs. The wound was then copiously irrigated. The deep fascial layer was closed over the plate with 2-0 Monocryl. The skin was then closed with interrupted 2-0 and then running subcuticular 3-0 Monocryl. The wound was infiltrated medially with 10ml 0.25% plain Marcaine. Steri-Strips and a soft sterile dressing were applied. The arm was placed into an abduction sling and the patient was extubated and transported to recovery in stable condition. There were no apparent intraoperative complications.     POSTOPERATIVE PLAN:   1. The patient will have the arm in a sling for comfort, and is to have a sling support the weight of the arm for 6 weeks. She should have hand, wrist and elbow range of motion as tolerated.   2. The patient will follow up in clinic in 1 week for wound check and new radiographs at that time.     ELVIE STEINBERG MD

## 2022-04-19 DIAGNOSIS — S42.021A CLOSED DISPLACED FRACTURE OF SHAFT OF RIGHT CLAVICLE, INITIAL ENCOUNTER: Primary | ICD-10-CM

## 2022-04-20 ENCOUNTER — OFFICE VISIT (OUTPATIENT)
Dept: ORTHOPEDICS | Facility: CLINIC | Age: 22
End: 2022-04-20
Payer: COMMERCIAL

## 2022-04-20 DIAGNOSIS — G54.0 BRACHIAL PLEXOPATHY: Primary | ICD-10-CM

## 2022-04-20 PROCEDURE — 99024 POSTOP FOLLOW-UP VISIT: CPT | Performed by: ORTHOPAEDIC SURGERY

## 2022-04-20 NOTE — NURSING NOTE
Reason For Visit:   Chief Complaint   Patient presents with     Surgical Followup     1 week pop DOS: 4/12/22 ORIF right clavicle       PCP: Meme Rodriguez (Inactive)  Ref: Emergency Room     ?  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  On medical leave.  Date of injury: 4/10/22  Type of injury: Rollerblading and landed on right shoulder.  Date of surgery: No history of shoulder surgeries; no recent shoulder cortisone injections  Date of surgery: 4/12/22  Type of surgery: Open reduction internal fixation right clavicle fracture  Smoker: No  Request smoking cessation information: No     Right hand dominant    SANE score  Affected shoulder: Right  Right shoulder SANE: 0  Left shoulder SANE: 100    LMP 03/27/2022 (Approximate)       Pain Assessment  Patient Currently in Pain: Gloria Lau, ATC

## 2022-04-20 NOTE — PROGRESS NOTES
Procedure: Open reduction internal fixation right clavicle fracture.   DOS: 4/12/2022    HISTORY OF PRESENT ILLNESS:  Min Anthony is a 21 year old female who presents for postoperative check for above procedure.  Patient reports that her pain is greatly improved following her surgical intervention.  She is currently taking ibuprofen twice daily, and she states that this has been beneficial in managing her discomfort.  Her discomfort is predominantly localized to her surgical site.  She has noted persistent distal neuro dysfunction specifically, patient notes decreased sensation associated with her thumb, index, and long finger (index finger most significantly affected), and difficulty with finger extension, and thumb extension.  She states that these neuro deficits have been stable following her surgical intervention.    She reports that she has been adherent with her postoperative restrictions.  She continues to wear her sling at all times except for hygiene.  She took her surgical dressing off yesterday; no concerns with regards to her surgical incision.    Patient has no additional questions or concerns to address for today's visit.    PHYSICAL EXAM:  General: alert, oriented, no acute distress, answers questions appropriately  Pulmonary: breathing comfortably on RA  Cardiovascular: warm and well perfused  RUE:  Well-healing surgical incision without concern for associated infection.  Steri-Strips remain in place.  Suture tails were trimmed during today's visit without incident  Patient's posterior shoulder abrasion is healing well without concern for associated infection  Equivocal firing of EPL  Able to fire FPL, FDS and FDP of all digits, interossei, wrist flexors/extensors  Able to weakly fire EDC of all digits  Tenodesis effect of fingers intact  Decreased sensation associated with the median nerve distribution most significantly impacting the index finger however, does note decreased sensation  associated with the thumb, and radial aspect of the long finger  Sensation is intact to light touch throughout median, and ulnar nerve distributions  Warm and well-perfused  Brisk capillary refill    IMAGING:  XR right clavicle obtained on 4/20/2022 demonstrates interval placement of surgical hardware in appropriate position.  Maintained fracture reduction compared to imaging obtained intraoperatively.      ASSESSMENT:  Min Anthony is a 21 year old female who underwent open reduction internal fixation right clavicle fracture on 4/12/2022 with Dr. Luna.     PLAN:  Patient is to continue wearing her sling at all times except for hygiene, and daily ROM exercises for an additional 5 weeks.  She should come out of her sling several times per day to perform elbow ROM.  No shoulder ROM allowed at this point in time.    Patient is to remain nonweightbearing to her right upper extremity for an additional 5 weeks    Instructed patient to allow Steri-Strips to fall off over time.    Given patient's distal neuro dysfunction, referral was placed to hand therapy.  Discussed with patient that her neuro dysfunction is most likely secondary to the inciting incident which led to her traumatic fracture.  Based on the current status of her physical exam, it is likely that she will regain full function however, nerve injuries are unpredictable with regards to timing of recovery therefore, patient's was encouraged.  Recommend that patient continue to utilize her hand as able.    Follow-up in orthopedic clinic in 5 weeks with repeat imaging right clavicle obtained at that time.    Patient acknowledged understanding of the above care plan; no additional questions or concerns at this point time.    Patient was evaluated and discussed with Dr. Luna who is in agreement with the above care plan.    Farhat Saleem MD  Orthopaedic Surgery, PGY-4

## 2022-04-20 NOTE — LETTER
4/20/2022         RE: Min Anthony  [address] [address] [facility] [address]  [place] [facility] [address]        Dear Colleague,    Thank you for referring your patient, Min Anthony, to the [facility] [place]. Please see a copy of my visit note below.    Procedure: Open reduction internal fixation right clavicle fracture.   DOS: 4/12/2022    HISTORY OF PRESENT ILLNESS:  Min Anthony is a 21 year old female who presents for postoperative check for above procedure.  Patient reports that her pain is greatly improved following her surgical intervention.  She is currently taking ibuprofen twice daily, and she states that this has been beneficial in managing her discomfort.  Her discomfort is predominantly localized to her surgical site.  She has noted persistent distal neuro dysfunction specifically, patient notes decreased sensation associated with her thumb, index, and long finger (index finger most significantly affected), and difficulty with finger extension, and thumb extension.  She states that these neuro deficits have been stable following her surgical intervention.    She reports that she has been adherent with her postoperative restrictions.  She continues to wear her sling at all times except for hygiene.  She took her surgical dressing off yesterday; no concerns with regards to her surgical incision.    Patient has no additional questions or concerns to address for today's visit.    PHYSICAL EXAM:  General: alert, oriented, no acute distress, answers questions appropriately  Pulmonary: breathing comfortably on RA  Cardiovascular: warm and well perfused  RUE:  Well-healing surgical incision without concern for associated infection.  Steri-Strips remain in place.  Suture tails were trimmed during today's visit without incident  Patient's posterior shoulder abrasion is healing well without concern for associated infection  Equivocal firing of EPL  Able to fire FPL, FDS and FDP of all digits,  interossei, wrist flexors/extensors  Able to weakly fire EDC of all digits  Tenodesis effect of fingers intact  Decreased sensation associated with the median nerve distribution most significantly impacting the index finger however, does note decreased sensation associated with the thumb, and radial aspect of the long finger  Sensation is intact to light touch throughout median, and ulnar nerve distributions  Warm and well-perfused  Brisk capillary refill    IMAGING:  XR right clavicle obtained on 4/20/2022 demonstrates interval placement of surgical hardware in appropriate position.  Maintained fracture reduction compared to imaging obtained intraoperatively.      ASSESSMENT:  Min Anthony is a 21 year old female who underwent open reduction internal fixation right clavicle fracture on 4/12/2022 with Dr. Luna.     PLAN:  Patient is to continue wearing her sling at all times except for hygiene, and daily ROM exercises for an additional 5 weeks.  She should come out of her sling several times per day to perform elbow ROM.  No shoulder ROM allowed at this point in time.    Patient is to remain nonweightbearing to her right upper extremity for an additional 5 weeks    Instructed patient to allow Steri-Strips to fall off over time.    Given patient's distal neuro dysfunction, referral was placed to hand therapy.  Discussed with patient that her neuro dysfunction is most likely secondary to the inciting incident which led to her traumatic fracture.  Based on the current status of her physical exam, it is likely that she will regain full function however, nerve injuries are unpredictable with regards to timing of recovery therefore, patient's was encouraged.  Recommend that patient continue to utilize her hand as able.    Follow-up in orthopedic clinic in 5 weeks with repeat imaging right clavicle obtained at that time.    Patient acknowledged understanding of the above care plan; no additional questions or  concerns at this point time.    Patient was evaluated and discussed with Dr. Luna who is in agreement with the above care plan.    Farhat Saleem MD  Orthopaedic Surgery, PGY-4

## 2022-04-22 ENCOUNTER — TELEPHONE (OUTPATIENT)
Dept: ORTHOPEDICS | Facility: CLINIC | Age: 22
End: 2022-04-22
Payer: COMMERCIAL

## 2022-04-22 NOTE — TELEPHONE ENCOUNTER
Patient was called and a message was left.      The order that Dr. Luna placed for Hand Therapy does include the elbow.  They will be able take a look at the elbow and offer some treatment.  If she is still in the sling she can come out of the sling when she is sitting and do elbow ROM, flexion, extension, supination, and pronation.  This can help with the elbow pain and it being stuck in a bent position in the sling can cause some discomfort.  Our number was left to call back with any other questions.

## 2022-04-22 NOTE — TELEPHONE ENCOUNTER
[facility] Call Center    Phone Message    May a detailed message be left on voicemail: yes     Reason for Call: Other: patient wants to disucss if she should add her Elbow to her PT she is doing      Action Taken: Message routed to:  [facility] ortho    Travel Screening: Not Applicable     Patient would like rn calling back to further discuss her elbow symptoms & PT

## 2022-04-27 NOTE — ADDENDUM NOTE
Addendum  created 04/27/22 1634 by Sid Najera MD    Clinical Note Signed, Diagnosis association updated, Intraprocedure Blocks edited, SmartForm saved

## 2022-05-02 ENCOUNTER — THERAPY VISIT (OUTPATIENT)
Dept: OCCUPATIONAL THERAPY | Facility: CLINIC | Age: 22
End: 2022-05-02
Attending: ORTHOPAEDIC SURGERY
Payer: COMMERCIAL

## 2022-05-02 DIAGNOSIS — G54.0 BRACHIAL PLEXOPATHY: ICD-10-CM

## 2022-05-02 DIAGNOSIS — R20.2 PARESTHESIAS: ICD-10-CM

## 2022-05-02 DIAGNOSIS — R29.898 HAND WEAKNESS: Primary | ICD-10-CM

## 2022-05-02 PROCEDURE — 97110 THERAPEUTIC EXERCISES: CPT | Mod: GO | Performed by: OCCUPATIONAL THERAPIST

## 2022-05-02 NOTE — PROGRESS NOTES
Hand Therapy Initial Evaluation    Current Date:  5/2/2022    Diagnosis:  Right mid shaft clavicle fracture with comminution and shortening; brachial plexopathy  DOS: 4/12/22  Procedure:  Open reduction internal fixation right clavicle fracture with mid shaft superior clavicle plate  Orders: 4/20/22  Post:  2w 6d  RHD    Precautions: sling x 6 weeks starting 4/14/22 (until 5/24); NWB R UE.  Patient is to continue wearing her sling at all times except for hygiene; come out of her sling several times per day to perform elbow ROM; No shoulder ROM allowed at this point in time.    Subjective:  Min Anthony is a 21 year old female.  Noting changes since she saw the MD. Can draw now, can type a bit better.    Patient reports symptoms of the right clavicle and hand which occurred due to fall when rollerblading. Since onset symptoms are Gradually getting better.  General health as reported by patient is excellent.  Pertinent medical history includes:None  Medical allergies:none.  Pertinent medical history includes: See electronic medical record  Medical allergies: none.  Surgical history: none pertinent to the UEs; See electronic medical record Medication history: See electronic medical record    Current occupation is dental hygienist  Job Tasks: Computer Work, Operating a Machine, Assembly, Repetitive Tasks    Occupational Profile Information:  Right hand dominant  Prior functional level:  no limitations  Patient reports symptoms of pain, stiffness/loss of motion, weakness/loss of strength, edema and numbness  Special tests:  x-ray.    Previous treatment: surgery  Barriers include:drives if she needs to or boyfriend drives  Mobility: No difficulty  Transportation: drives  Currently not working due to present treatment problem  Leisure activities/hobbies: roller blading, drawing, painting, weightlifting    Functional Outcome Measure:   TBD    Objective:  Pain Level (Scale 0-10)   5/2/2022   At Rest 0   With Use With  "elbow motion can be up to a 6, with reflex motion, sore with elbow full ext     Pain Description  Date 5/2/2022   Location elbow and clavicle   Pain Quality Sharp if pushed   Frequency intermittent     Pain is worst  daytime   Exacerbated by  elbow further ROM ranges   Relieved by rest    Progression improving     Edema  None    Sensation   Sensation Assessment: Ten touch Test  [10=normal, no loss of sensation compared to opposite hand, 0= loss of sensation, compared to opposite hand]  Light touch to central portion of finger pad  Ten Touch test 5/2/2022 5/2/2022   0-10/10 Right Left   Location:     Dorsal FA 8 10   Index Dorsal 8  Volar 4 \"   thumb Dorsal 8  Volar 4  Radial side 5 \"   middle Little numb at tip  Otherwise WNL \"   Small WNL \"       ROM  Pain Report: - none  + mild    ++ moderate    +++ severe   Elbow 5/2/2022 5/2/2022   AROM (PROM) R L   Extension -35 (seated, no support)  -25 supine    No crepitus or instability noted in the elbow. Tight bicep tendon noted, in addition to general soft tissue tightness. 6 HE   Flexion 115 128   Supination 76 80   Pronation 70 AROM 75 PROM 80       NERVE FUNCTION SCREEN  Hand: R    Date: 5/2/2022 WNL weak absent   Median Nerve       FDP  to IF x     FPL x     APB x           Radial Nerve      EDC  x    Thumbs up  x    ECRB/L x       Ulnar Nerve      Finger abd/add x     Thumb adduction (Add Pollicis) x         Strength:  Pain-free /Pinch Test    5/2/2022   Trials R L   1   31 66       3 Pt Pinch  5/2/2022   Trials R L   1   10 15       Assessment:  Patient presents with symptoms consistent with diagnosis above. Of note, the sensation is most affected in the median nerve in the hand, and there is weakness of the R finger and thumb extensors. Her elbow is stiff, with tight soft tissues noted.      Patient's limitations or Problem List includes: pain, weakness, paresthesias, decreased ROM of the right elbow and hand which interferes with the patient's ability to " perform ADLs and instrumental activities of daily living as compared to previous level of function.    Rehab Potential:  Excellent, expect return to normal activities.    Patient will benefit from skilled Occupational Therapy to increase ROM and decrease pain, to return to previous activity level and resume normal daily tasks and to reach their rehab potential.    Barriers to Learning:  no barriers    Communication Issues:  Patient appears to be able to clearly communicate and understand verbal and written communication and follow directions correctly.    Chart Review: Brief history including review of medical and/or therapy records relating to the presenting problem and Simple history review with patient    Identified Performance Deficits: work, recreation , self cares and home establishment and management.  Assessment of Occupational Performance:  3-5 Performance Deficits    Clinical Decision Making (Complexity): Low complexity    Treatment Explanation:  The following has been discussed with the patient:  RX ordered/plan of care  Anticipated outcomes  Possible risks and side effects    Plan:  Frequency:  2 X a month, once daily  Duration:  for 3 months    Treatment Plan:   Therapeutic Exercise:  AROM and PROM  Neuromuscular re-education:  Nerve Gliding, Coordination/Dexterity, Sensory re-education, Desensitization, Proprioceptive Training and Kinesiotaping  Self Care:  Self Care Tasks and Ergonomic Considerations    Discharge Plan:  Achieve all LTG.  Independent in home treatment program.  Reach maximal therapeutic benefit.    Home Program: See flowsheet    Next visit/ Plan:   F/up in about 2 weeks.    Thank you for the opportunity to work with this patient.   If you have questions or concerns, please contact me with an in basket message or via the information below.     Joanna Anthony MS, OTR/L, CHT  Certified Hand Therapist    [facility] - Hand Therapy  Clinics and Surgery  [address], [address] [place] [address]    Email: [email or web address]   Phone / Voicemail: [phone]   Hand Therapy  phone: [phone] (staffed M-F)  Fax: [phone]

## 2022-05-06 ENCOUNTER — TELEPHONE (OUTPATIENT)
Dept: ORTHOPEDICS | Facility: CLINIC | Age: 22
End: 2022-05-06
Payer: COMMERCIAL

## 2022-05-06 NOTE — TELEPHONE ENCOUNTER
[facility] Call Center    Phone Message    May a detailed message be left on voicemail: yes     Reason for Call: Form or Letter   Type or form/letter needing completion: Letter to be cleared to go back to work part time with restrictions to use hand with sling.  Provider: Amada Luna  Date form needed: asap  Once completed: Patient will  at .      Action Taken: Message routed to:  [facility] Orthopedics    Travel Screening: Not Applicable

## 2022-05-06 NOTE — TELEPHONE ENCOUNTER
Patient was attempted to be called back to discuss her return to work letter.  The call stated that the number was not in service.      A letter has been written and placed in there chart that she can return to work with no use of the right upper extremity.

## 2022-05-06 NOTE — LETTER
May 6, 2022         Patient:  Min Anthony  :   2000  MRN:     [id number]  Physician: AMADA LUNA may return to work on Date: 22 with no use of the right upper extremity.      The next clinic appointment is scheduled for (date/time) 22.      Please call the clinic with any questions.      Electronically signed by Amada Luna MD

## 2022-05-07 ENCOUNTER — HEALTH MAINTENANCE LETTER (OUTPATIENT)
Age: 22
End: 2022-05-07

## 2022-05-10 NOTE — ADDENDUM NOTE
Addendum  created 05/10/22 0817 by Sid Najera MD    Clinical Note Signed, Diagnosis association updated, Intraprocedure Blocks edited, SmartForm saved

## 2022-05-17 DIAGNOSIS — S42.021A CLOSED DISPLACED FRACTURE OF SHAFT OF RIGHT CLAVICLE, INITIAL ENCOUNTER: Primary | ICD-10-CM

## 2022-05-18 ENCOUNTER — THERAPY VISIT (OUTPATIENT)
Dept: OCCUPATIONAL THERAPY | Facility: CLINIC | Age: 22
End: 2022-05-18
Payer: COMMERCIAL

## 2022-05-18 ENCOUNTER — OFFICE VISIT (OUTPATIENT)
Dept: ORTHOPEDICS | Facility: CLINIC | Age: 22
End: 2022-05-18
Payer: COMMERCIAL

## 2022-05-18 DIAGNOSIS — R20.2 PARESTHESIAS: Primary | ICD-10-CM

## 2022-05-18 DIAGNOSIS — S42.021D CLOSED DISPLACED FRACTURE OF SHAFT OF RIGHT CLAVICLE WITH ROUTINE HEALING, SUBSEQUENT ENCOUNTER: Primary | ICD-10-CM

## 2022-05-18 DIAGNOSIS — R29.898 HAND WEAKNESS: ICD-10-CM

## 2022-05-18 PROCEDURE — 99024 POSTOP FOLLOW-UP VISIT: CPT | Performed by: ORTHOPAEDIC SURGERY

## 2022-05-18 PROCEDURE — 97110 THERAPEUTIC EXERCISES: CPT | Mod: GO | Performed by: OCCUPATIONAL THERAPIST

## 2022-05-18 NOTE — LETTER
5/18/2022         RE: Min Anthony  [address]  [facility] [address]        Dear Colleague,    Thank you for referring your patient, Min Anthony, to the [facility] [place]. Please see a copy of my visit note below.    Date of Service: May 18, 2022    Chief Complaint: Post operative follow up.     Date of Surgery: 4/12/2022    Procedure Performed: R clavicle ORIF     Interval events: Min Anthony is a 21 year old female who presents today for a postoperative follow up.     Notably, she had brachial plexus symptoms after her injury including numbness and weakness of the RUE. She has been working with hand therapy and this is improving. Is wearing her sling. Works as a dental hygienist.    Physical examination:  Focused exam of right upper extremity demonstrates well-healed clavicle incision with no erythema or drainage.  Clavicle is nontender to palpation.     5/5 strength with deltoid.  4-5 strength with biceps and triceps  4 5 strength with wrist extensors  5 of 5 strength with wrist flexors, FDP/FDS, and intrinsics.  SILT m/r/u/ax/labc  +radial pulse    Radiographs: Clavicle x-rays today redemonstrate ORIF with good position of hardware and no evidence of loosening or hardware failure.  Fracture line no longer visible, consistent with having healed.  Overall well-appearing x-ray without interval change in alignment.    Assessment: 21 year old female s/p right clavicle ORIF 5 weeks ago, progressing appropriately.     Plan:    2#WB RUE  RUE ROMAT  OK to resume work, note provided  RTC 6 weeks w/ repeat XR    Seen and d/w Dr. Jeremy Salazar MD  Orthopaedic Surgery, PGY-4  Pager: [phone]      Amada Luna MD

## 2022-05-18 NOTE — PROGRESS NOTES
Date of Service: May 18, 2022    Chief Complaint: Post operative follow up.     Date of Surgery: 4/12/2022    Procedure Performed: R clavicle ORIF     Interval events: Min Anthony is a 21 year old female who presents today for a postoperative follow up.     Notably, she had brachial plexus symptoms after her injury including numbness and weakness of the RUE. She has been working with hand therapy and this is improving. Is wearing her sling. Works as a dental hygienist.    Physical examination:  Focused exam of right upper extremity demonstrates well-healed clavicle incision with no erythema or drainage.  Clavicle is nontender to palpation.     5/5 strength with deltoid.  4-5 strength with biceps and triceps  4 5 strength with wrist extensors  5 of 5 strength with wrist flexors, FDP/FDS, and intrinsics.  SILT m/r/u/ax/labc  +radial pulse    Radiographs: Clavicle x-rays today redemonstrate ORIF with good position of hardware and no evidence of loosening or hardware failure.  Fracture line no longer visible, consistent with having healed.  Overall well-appearing x-ray without interval change in alignment.    Assessment: 21 year old female s/p right clavicle ORIF 5 weeks ago, progressing appropriately.     Plan:    2#WB RUE  RUE ROMAT  OK to resume work, note provided  RTC 6 weeks w/ repeat XR    Seen and d/w Dr. Jeremy Salazar MD  Orthopaedic Surgery, PGY-4  Pager: [phone]

## 2022-05-18 NOTE — LETTER
Min Anthony     2000  Encounter date/time:  05/18/22       Report of Workability    Diagnosis:  Right shoulder injury    Limitations:  No lift, push, pull, carry more than 2 pounds.  Upon return to clinical work, limit away from body work or repetitive away from body work as tolerated or symptoms mandate.      Return to work status: May work WITH limitations above.    Follow-Up:   Return to clinic in 6 weeks.

## 2022-05-18 NOTE — NURSING NOTE
Reason For Visit:   Chief Complaint   Patient presents with     Surgical Followup     5 week post op DOS: 4/12/22 Right clavicle ORIF        PCP: Meme Rodriguez (Inactive)  Ref: Emergency Room     ?  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  On medical leave.  Date of injury: 4/10/22  Type of injury: Rollerblading and landed on right shoulder.  Date of surgery: No history of shoulder surgeries; no recent shoulder cortisone injections  Date of surgery: 4/12/22  Type of surgery: Open reduction internal fixation right clavicle fracture  Smoker: No  Request smoking cessation information: No     Right hand dominant    SANE score  Affected shoulder: Right  Right shoulder SANE: 0  Left shoulder SANE: 100    LMP 03/27/2022 (Approximate)       Pain Assessment  Patient Currently in Pain: Denies (numbess on the chest right below the insicion)    Adelina Lau, ATC

## 2022-05-18 NOTE — PROGRESS NOTES
"SOAP note information for 5/18/2022.  Please refer to the daily flowsheet for treatment today, total treatment time and time spent performing 1:1 timed codes.       Diagnosis:  Right mid shaft clavicle fracture with comminution and shortening; brachial plexopathy  DOS: 4/12/22  Procedure:  Open reduction internal fixation right clavicle fracture with mid shaft superior clavicle plate  Orders: 4/20/22  Post:  5w 2d   RHD    Precautions: sling x 6 weeks starting 4/14/22 (until 5/24); NWB R UE.  Patient is to continue wearing her sling at all times except for hygiene; come out of her sling several times per day to perform elbow ROM; No shoulder ROM allowed at this point in time.    Subjective:  Its doing better.  Has been laying on the bed and letting the elbow extend.      Occupational Profile Information:  Right hand dominant  Current occupation is dental hygienist  Barriers include:drives if she needs to or boyfriend drives  Transportation: drives  Currently not working due to present treatment problem  Leisure activities/hobbies: roller blading, drawing, painting, weightlifting    Functional Outcome Measure:   TBD    Objective:  Pain Level (Scale 0-10)   5/2/2022   At Rest 0   With Use With elbow motion can be up to a 6, with reflex motion, sore with elbow full ext     Pain Description  Date 5/2/2022   Location elbow and clavicle   Pain Quality Sharp if pushed   Frequency intermittent     Pain is worst  daytime   Exacerbated by  elbow further ROM ranges   Relieved by rest    Progression improving     Edema  None    Sensation   Sensation Assessment: Ten touch Test  [10=normal, no loss of sensation compared to opposite hand, 0= loss of sensation, compared to opposite hand]  Light touch to central portion of finger pad  Ten Touch test 5/2/2022 5/2/2022   0-10/10 Right Left   Location:     Dorsal FA 8 10   Index Dorsal 8  Volar 4 \"   thumb Dorsal 8  Volar 4  Radial side 5 \"   middle Little numb at tip  Otherwise WNL \" " "  Small WNL \"       ROM  Pain Report: - none  + mild    ++ moderate    +++ severe   Elbow 5/2/2022 5/2/2022 5/18/2022     AROM (PROM) R L R   Extension -35 (seated, no support)  -25 supine    No crepitus or instability noted in the elbow. Tight bicep tendon noted, in addition to general soft tissue tightness. 6 HE -26   Flexion 115 128 129   Supination 76 80 80   Pronation 70 AROM 75 PROM 80 80-85       NERVE FUNCTION SCREEN  Hand: R    Date: 5/2/2022 WNL weak absent   Median Nerve       FDP  to IF x     FPL x     APB x           Radial Nerve      EDC  x    Thumbs up  x    ECRB/L x       Ulnar Nerve      Finger abd/add x     Thumb adduction (Add Pollicis) x       Wrist Strength  Manual muscle testing    Manual Muscle Test  Grade: 0-5/5 (5/5=Normal) 5/18/2022       Right Pain   ext 5 -   Ext+ RD 5 -   Ext + UD 5 -        Pain Report:    + mild    ++ moderate    +++ severe     Strength:  Pain-free /Pinch Test    5/2/2022   Trials R L   1   31 66       3 Pt Pinch  5/2/2022   Trials R L   1   10 15       "

## 2022-06-27 DIAGNOSIS — S42.021D CLOSED DISPLACED FRACTURE OF SHAFT OF RIGHT CLAVICLE WITH ROUTINE HEALING, SUBSEQUENT ENCOUNTER: Primary | ICD-10-CM

## 2022-06-28 NOTE — ADDENDUM NOTE
Addendum  created 06/28/22 0901 by Sid Najera MD    Clinical Note Signed, Diagnosis association updated, Intraprocedure Blocks edited, SmartForm saved

## 2022-06-29 ENCOUNTER — OFFICE VISIT (OUTPATIENT)
Dept: ORTHOPEDICS | Facility: CLINIC | Age: 22
End: 2022-06-29
Payer: COMMERCIAL

## 2022-06-29 ENCOUNTER — ANCILLARY PROCEDURE (OUTPATIENT)
Dept: GENERAL RADIOLOGY | Facility: CLINIC | Age: 22
End: 2022-06-29
Attending: ORTHOPAEDIC SURGERY
Payer: COMMERCIAL

## 2022-06-29 DIAGNOSIS — S42.021D CLOSED DISPLACED FRACTURE OF SHAFT OF RIGHT CLAVICLE WITH ROUTINE HEALING, SUBSEQUENT ENCOUNTER: ICD-10-CM

## 2022-06-29 DIAGNOSIS — S42.021D CLOSED DISPLACED FRACTURE OF SHAFT OF RIGHT CLAVICLE WITH ROUTINE HEALING, SUBSEQUENT ENCOUNTER: Primary | ICD-10-CM

## 2022-06-29 PROCEDURE — 73000 X-RAY EXAM OF COLLAR BONE: CPT | Mod: RT | Performed by: RADIOLOGY

## 2022-06-29 PROCEDURE — 99024 POSTOP FOLLOW-UP VISIT: CPT | Performed by: ORTHOPAEDIC SURGERY

## 2022-06-29 NOTE — NURSING NOTE
Reason For Visit:   Chief Complaint   Patient presents with     Surgical Followup     3 month post op DOS: 4/12/22 Right clavicle ORIF        PCP: Meme Rodriguez (Inactive)  Ref: Emergency Room     ?  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  On medical leave.  Date of injury: 4/10/22  Type of injury: Rollerblading and landed on right shoulder.  Date of surgery: No history of shoulder surgeries; no recent shoulder cortisone injections  Date of surgery: 4/12/22  Type of surgery: Open reduction internal fixation right clavicle fracture  Smoker: No  Request smoking cessation information: No     Right hand dominant    SANE score  Affected shoulder: Right  Right shoulder SANE: 100  Left shoulder SANE: 100    There were no vitals taken for this visit.      Pain Assessment  Patient Currently in Pain: Gloria Lau, ATC

## 2022-06-29 NOTE — LETTER
6/29/2022         RE: Min Anthony  [address]  [facility] [address]        Dear Colleague,    Thank you for referring your patient, Min Anthony, to the [facility] [place]. Please see a copy of my visit note below.    CHIEF CONCERN:  Right clavicle ORIF  DATE OF SURGERY: 4/12/22    HISTORY OF PRESENT ILLNESS:  Smooth is a 21 year old RHD woman who is 3 months s/p the above procedure. She is doing very well and has no concerns with her shoulder. She is back working as a dental hygenist.     PHYSICAL EXAM:    Adult woman in no acute distress. Articulates and communicates with normal affect.  Respirations even and unlabored  Focused upper extremity exam: Incision well healed. No erythema. No tenderness. Full shoulder active ROM without restriction. CMS intact into the hand.     IMAGING:  Right clavicle XRs demonstrates hardware is stable/solid and fracture is healed.    ASSESSMENT:  1. Three months status post above procedure    PLAN:  We discussed the imaging and exam. She will pursue activities as tolerated. She will return prn.     Amada Luna MD

## 2022-07-11 NOTE — PROGRESS NOTES
CHIEF CONCERN:  Right clavicle ORIF  DATE OF SURGERY: 4/12/22    HISTORY OF PRESENT ILLNESS:  Smooth is a 21 year old RHD woman who is 3 months s/p the above procedure. She is doing very well and has no concerns with her shoulder. She is back working as a dental hygenist.     PHYSICAL EXAM:    Adult woman in no acute distress. Articulates and communicates with normal affect.  Respirations even and unlabored  Focused upper extremity exam: Incision well healed. No erythema. No tenderness. Full shoulder active ROM without restriction. CMS intact into the hand.     IMAGING:  Right clavicle XRs demonstrates hardware is stable/solid and fracture is healed.    ASSESSMENT:  1. Three months status post above procedure    PLAN:  We discussed the imaging and exam. She will pursue activities as tolerated. She will return prn.     Amada Luna MD

## 2022-08-04 PROBLEM — R20.2 PARESTHESIAS: Status: RESOLVED | Noted: 2022-05-02 | Resolved: 2022-08-04

## 2022-08-04 PROBLEM — R29.898 HAND WEAKNESS: Status: RESOLVED | Noted: 2022-05-02 | Resolved: 2022-08-04

## 2022-10-07 NOTE — ADDENDUM NOTE
Addendum  created 10/07/22 1213 by Sid Najera MD    Clinical Note Signed, Diagnosis association updated, Intraprocedure Blocks edited, SmartForm saved

## 2023-01-07 ENCOUNTER — HEALTH MAINTENANCE LETTER (OUTPATIENT)
Age: 23
End: 2023-01-07

## 2023-06-02 ENCOUNTER — HEALTH MAINTENANCE LETTER (OUTPATIENT)
Age: 23
End: 2023-06-02

## 2023-07-03 ENCOUNTER — VIRTUAL VISIT (OUTPATIENT)
Dept: FAMILY MEDICINE | Facility: CLINIC | Age: 23
End: 2023-07-03
Payer: COMMERCIAL

## 2023-07-03 DIAGNOSIS — K21.00 GASTROESOPHAGEAL REFLUX DISEASE WITH ESOPHAGITIS WITHOUT HEMORRHAGE: Primary | ICD-10-CM

## 2023-07-03 PROBLEM — D24.2 FIBROADENOMA OF BREAST, LEFT: Status: ACTIVE | Noted: 2018-08-01

## 2023-07-03 PROCEDURE — 99203 OFFICE O/P NEW LOW 30 MIN: CPT | Mod: VID | Performed by: PHYSICIAN ASSISTANT

## 2023-07-03 RX ORDER — OMEPRAZOLE 40 MG/1
40 CAPSULE, DELAYED RELEASE ORAL DAILY
Qty: 30 CAPSULE | Refills: 1 | Status: SHIPPED | OUTPATIENT
Start: 2023-07-03 | End: 2024-09-06

## 2023-07-03 NOTE — PROGRESS NOTES
Smooth is a 22 year old who is being evaluated via a billable video visit.      How would you like to obtain your AVS? MyChart  If the video visit is dropped, the invitation should be resent by: Text to cell phone: [phone]  Will anyone else be joining your video visit? No          Assessment & Plan     Gastroesophageal reflux disease with esophagitis without hemorrhage  Lab only appointment for official Hpylori testing but will start with at least omeprazole pending official results. See patient instructions and options discussed with patient. Return to clinic with any worsening or changes in symptoms and follow up with PCP for routine care.   - Helicobacter pylori Antigen Stool; Future  - omeprazole (PRILOSEC) 40 MG DR capsule; Take 1 capsule (40 mg) by mouth daily    Review of prior external note(s) from - previous routine PCP notes  20 minutes spent by me on the date of the encounter doing chart review, history and exam, documentation and further activities per the note       Patient Instructions   Acid Suppression Treatment    Start omeprazole 40mg tab daily 30 minutes before breakfast for 2 weeks then take omeprazole 20mg (over the counter) 1 tab in AM for 1 week, then 1 tablet every other day for another week  If you wish, you can also add pepcid at bedtime if you have having symptoms at night.    Lifestyle changes:    Avoid eating 2-3 hours before bedtime.   You may find it helpful to elevate the head of your bed.     Avoid following foods that are likely to trigger acid reflux:    Coffee or tea   Anything that s fizzy or has caffeine in it  Alcohol   Citrus fruits, such as oranges and daniel  Tomato based foods (salsa, pizza, lasanga)  Chocolate   Mint or peppermint  Fatty foods (ice cream)  Spicy foods  Onions and garlic         Alvina Nieves PA-C  [facility]   Smooth is a 22 year old, presenting for the following health issues:  No chief complaint on file.          No data to display              History of Present Illness       Reason for visit:  Stomach pain  Symptom onset:  More than a month  Symptoms include:  Ill tell you  Symptom intensity:  Severe  Symptom progression:  Staying the same  Had these symptoms before:  Yes  Has tried/received treatment for these symptoms:  Yes  Previous treatment was successful:  Yes  Prior treatment description:  Antibiotics  What makes it worse:  Empty stomach  What makes it better:  No    She eats 2-3 servings of fruits and vegetables daily.She consumes 0 sweetened beverage(s) daily.She exercises with enough effort to increase her heart rate 30 to 60 minutes per day.  She exercises with enough effort to increase her heart rate 4 days per week.   She is taking medications regularly.       Stomach pain for a few months - nausea especially when stomach empty, bloating/increased gas.  History of Hpylori in the past with similar symptoms.  Patient has not tried any over the counter medications lately.  Patient tried omeprazole initially for about a month with some improvement.      Review of Systems   Constitutional, HEENT, cardiovascular, pulmonary, GI, , musculoskeletal, neuro, skin, endocrine and psych systems are negative, except as otherwise noted.      Objective           Vitals:  No vitals were obtained today due to virtual visit.    Physical Exam   GENERAL: Healthy, alert and no distress  EYES: Eyes grossly normal to inspection.  No discharge or erythema, or obvious scleral/conjunctival abnormalities.  RESP: No audible wheeze, cough, or visible cyanosis.  No visible retractions or increased work of breathing.    SKIN: Visible skin clear. No significant rash, abnormal pigmentation or lesions.  NEURO: Cranial nerves grossly intact.  Mentation and speech appropriate for age.  PSYCH: Mentation appears normal, affect normal/bright, judgement and insight intact, normal speech and appearance well-groomed.            Video-Visit  Details    Type of service:  Video Visit     Originating Location (pt. Location): Home    Distant Location (provider location):  Off-site  Platform used for Video Visit: Alli

## 2023-07-03 NOTE — PATIENT INSTRUCTIONS
Acid Suppression Treatment    Start omeprazole 40mg tab daily 30 minutes before breakfast for 2 weeks then take omeprazole 20mg (over the counter) 1 tab in AM for 1 week, then 1 tablet every other day for another week  If you wish, you can also add pepcid at bedtime if you have having symptoms at night.    Lifestyle changes:    Avoid eating 2-3 hours before bedtime.   You may find it helpful to elevate the head of your bed.     Avoid following foods that are likely to trigger acid reflux:    Coffee or tea   Anything that s fizzy or has caffeine in it  Alcohol   Citrus fruits, such as oranges and daniel  Tomato based foods (salsa, pizza, lasanga)  Chocolate   Mint or peppermint  Fatty foods (ice cream)  Spicy foods  Onions and garlic

## 2023-07-06 ENCOUNTER — LAB (OUTPATIENT)
Dept: LAB | Facility: CLINIC | Age: 23
End: 2023-07-06
Payer: COMMERCIAL

## 2023-07-06 DIAGNOSIS — K21.00 GASTROESOPHAGEAL REFLUX DISEASE WITH ESOPHAGITIS WITHOUT HEMORRHAGE: ICD-10-CM

## 2023-07-07 ENCOUNTER — APPOINTMENT (OUTPATIENT)
Dept: LAB | Facility: CLINIC | Age: 23
End: 2023-07-07
Payer: COMMERCIAL

## 2023-07-07 PROCEDURE — 87338 HPYLORI STOOL AG IA: CPT

## 2023-07-10 ENCOUNTER — MYC MEDICAL ADVICE (OUTPATIENT)
Dept: FAMILY MEDICINE | Facility: CLINIC | Age: 23
End: 2023-07-10
Payer: COMMERCIAL

## 2023-07-10 DIAGNOSIS — K21.00 GASTROESOPHAGEAL REFLUX DISEASE WITH ESOPHAGITIS WITHOUT HEMORRHAGE: Primary | ICD-10-CM

## 2023-07-10 LAB — H PYLORI AG STL QL IA: NEGATIVE

## 2023-07-10 NOTE — RESULT ENCOUNTER NOTE
"Kaela Copeland  Your attached lab is negative for Hpylori. Hopefully the over the counter treatment is starting to improve things.  Please contact the office with any questions or concerns.    Alvina Hicks \"Garrick\" HANK Nieves    "

## 2023-07-11 NOTE — TELEPHONE ENCOUNTER
"[employer] message sent.  Thanks  Alvina \"Garrick\" HANK Nieves   " ----- Message from Augustine Martinez sent at 11/29/2017 10:41 AM EST -----  Regarding: Emerson Navarro: [phone]  Pt needs a refill on his Metformin 500mg. Please submit to the pts local pharmacy. Also mom would like a copy of the last A1C mailed to her home.  Mom can be reached @ [phone]

## 2023-07-18 NOTE — TELEPHONE ENCOUNTER
MP,  Please see below [employer] message and advise.  Pended order for approval     Thanks,  ELOINA Dumas

## 2023-07-18 NOTE — TELEPHONE ENCOUNTER
"Signed, but let me know if trouble getting an appointment, we have a few other options; thanks  Alvina \"Garrick\" HANK Nieves   "

## 2023-07-26 ENCOUNTER — VIRTUAL VISIT (OUTPATIENT)
Dept: GASTROENTEROLOGY | Facility: CLINIC | Age: 23
End: 2023-07-26
Attending: PHYSICIAN ASSISTANT
Payer: COMMERCIAL

## 2023-07-26 DIAGNOSIS — R10.13 EPIGASTRIC PAIN: ICD-10-CM

## 2023-07-26 PROCEDURE — 99203 OFFICE O/P NEW LOW 30 MIN: CPT | Mod: VID | Performed by: PHYSICIAN ASSISTANT

## 2023-07-26 NOTE — PROGRESS NOTES
GASTROENTEROLOGY NEW PATIENT VIDEO VISIT    CC/REFERRING MD:    Alvina Nieves    REASON FOR CONSULTATION:   Alvina Nieves for No chief complaint on file.      HISTORY OF PRESENT ILLNESS:    Min Anthony is a 22 year old female with no significant past medical history who is being evaluated via a billable video visit for recurrent epigastric pain with associated symptoms of early satiety, nausea, and vomiting.  Patient states that the symptoms have been recurring for close to a year now.  Describes a burning pain in the epigastrium, can experience nausea often in the morning as well as episodes of vomiting.  Can develop a hunger type pain and nausea frequently.  Has treated with courses of omeprazole intermittently with some success, but not really with any lasting effect.  No dysphagia or odynophagia, but does endorse early satiety, needing to only take a few bites of a meal and then having to return to it.  This has led to some degree of weight loss.  Of note, patient does have a history of H. pylori several years ago and symptoms seem similar to that.  Her boyfriend developed similar symptoms this year and has apparently tested positive for H. pylori.  She recently completed a stool antigen H. pylori test earlier this month that came back negative, and she was off PPI therapy while doing this test.  No history of EGD.  Not on any other medications, does not use NSAIDs or aspirin.  Does endorse occasional alcohol use and marijuana use.  No history of abdominal surgeries.  No notable family medical history of digestive diseases.      I have reviewed and updated the patient's Past Medical History, Social History, Family History and Medication List.    Exam:    General appearance:  Healthy appearing adult, in no acute distress  Eyes:  Sclera anicteric, Pupils round and reactive to light  Ears, nose, mouth and throat:  No obvious external lesions of ears and nose.   Hearing intact  Neck:  Symmetric, No obvious external lesions  Respiratory:  Normal respiration, no use of accessory muscles   MSK:  No visual upper extremity, neck or facial muscle atrophy  ABD:  No visual abdominal distention, no audible borborygmi  Skin:  No rashes or jaundice   Psychiatric:  Oriented to person, place and time, Appropriate mood and affect.   Neurologic:  Peripheral muscle function and dexterity appear to be intact      PERTINENT STUDIES have been reviewed.    ASSESSMENT/PLAN:    Min Anthony is a 22 year old female who presents for evaluation of recurrent epigastric pain along with symptoms of early satiety, nausea, and vomiting.  We spent some time discussing potential etiologies, which would include gastritis/peptic ulcer disease, H. pylori infection, motility disorder such as gastroparesis, less likely biliary colic, and with marijuana use, cannabinoid hyperemesis syndrome is possible as well.  I recommended starting with EGD to further investigate her symptoms.  Subsequent testing could include a gastric emptying scan and upper abdominal ultrasound to assess for alternative diagnoses.  Avoidance of gastric irritants and continued use of omeprazole is a reasonable plan until EGD is completed.  I will follow-up with her after that to review results.      Video-Visit Details    Video Visit Time: 12 minutes    Type of service:  Video Visit    Originating Location (pt. Location): Home    Distant Location (provider location):  Off-site    Platform used for Video Visit: Alli Maria PA-C    RTC 4-6 weeks    Thank you for this consultation.  It was a pleasure to participate in the care of this patient; please contact us with any further questions.  A total of 16 minutes was spent with reviewing the chart, discussing with the patient, documentation and coordination of care.    This note was created with voice recognition software, and while reviewed for accuracy, typos may remain.      Neil Maria PA-C  Division of Gastroenterology, Hepatology and Nutrition  [facility]  [phone]

## 2024-06-29 ENCOUNTER — HEALTH MAINTENANCE LETTER (OUTPATIENT)
Age: 24
End: 2024-06-29

## 2024-09-06 ENCOUNTER — OFFICE VISIT (OUTPATIENT)
Dept: SURGERY | Facility: CLINIC | Age: 24
End: 2024-09-06
Payer: COMMERCIAL

## 2024-09-06 VITALS
HEART RATE: 113 BPM | OXYGEN SATURATION: 99 % | HEIGHT: 69 IN | BODY MASS INDEX: 19.26 KG/M2 | WEIGHT: 130 LBS | DIASTOLIC BLOOD PRESSURE: 68 MMHG | SYSTOLIC BLOOD PRESSURE: 102 MMHG

## 2024-09-06 DIAGNOSIS — N63.22 MASS OF UPPER INNER QUADRANT OF LEFT BREAST: Primary | ICD-10-CM

## 2024-09-06 PROCEDURE — 99203 OFFICE O/P NEW LOW 30 MIN: CPT | Performed by: SURGERY

## 2024-09-06 NOTE — PROGRESS NOTES
Breast Health History Form    Do you have any of the following symptoms?  Lump/ masses     In which breast are you having the symptoms?  left  Have you had a mammogram?  no        Have you ever had a breast cyst drained?  no        Have you had a breast biopsy in the past?  no        Have you ever had Breast Cancer?  no        Is there a history of Breast Cancer in your family?  no     Have you ever had Ovarian Cancer? no      Is there a history of Ovarian Cancer in your family?  no     Is there a history of Colon Cancer in your family?  no     Is there a history of Uterine Cancer in your family?  no     Any known genetic abnormalities in your family?  no     Summarize your caffeine intake?      Were you born with a uterus?    What age did your periods begin?  14  Date your last menstrual period began?  09/01/024  Number of full term pregnancies? 0   Your age when your first child was born?    Did you nurse your children?    Are you pregnant now?  no  Have you begun Menopause?  no     Have you had either ovary removed?  no     Have you had an intrauterine device (IUD) placed?  Yes 2 yrs         Do you have breast implants?  no  Have you used hormone replacement therapy?  no        Have you taken oral contraceptive pills?  no     What is your current bra size?  32c

## 2024-09-06 NOTE — PROGRESS NOTES
[facility] Breast Surgery Consultation    HPI:   Min Anthony is a 24 year old female who is seen in consultation at the request of herself for evaluation of a left breast mass.     She reports she first noticed the mass in her left breast in 2019.  She had an ultrasound at that time that had revealed a well-circumscribed hypoechoic mass clinically consistent with fibroadenoma.  She had a follow-up ultrasound in 2020 and the mass had grown at that time and a biopsy or surgical excision was recommended.  It was not bothering her much at that time and therefore she had let it be.  She feels it has gotten bigger since then and is now more symptomatic.  She has not had any prior breast biopsies or surgeries.  She had a clavicle surgery 2 years ago and her wisdom teeth out and has done fine with anesthesia.    Hormonal history:   menarche 14, no children, pre-menopausal, no OCP use, no HRT, no fertility treatment.     Family history of breast cancer: Yes -paternal grandmother at 55  Family history of ovarian cancer:  No  Family history of colon cancer: No  Family history of prostate cancer: No      Past Medical History:   has no past medical history on file.    No current outpatient medications on file.    Past Surgical History:  Past Surgical History:   Procedure Laterality Date    OPEN REDUCTION INTERNAL FIXATION CLAVICLE Right 4/12/2022    Procedure: OPEN REDUCTION INTERNAL FIXATION, FRACTURE, CLAVICLE RIGHT;  Surgeon: Amada Luna MD;  Location: [facility] OR         No Known Allergies      Social History:  Social History     Socioeconomic History    Marital status: Single     Spouse name: Not on file    Number of children: Not on file    Years of education: Not on file    Highest education level: Not on file   Occupational History    Not on file   Tobacco Use    Smoking status: Never    Smokeless tobacco: Never   Substance and Sexual Activity    Alcohol use: Yes     Comment: socially     "Drug use: Not Currently    Sexual activity: Yes     Partners: Male     Birth control/protection: I.U.D.   Other Topics Concern    Not on file   Social History Narrative    Not on file     Social Determinants of Health     Financial Resource Strain: Not on file   Food Insecurity: Not on file   Transportation Needs: Not on file   Physical Activity: Not on file   Stress: Not on file   Social Connections: Not on file   Interpersonal Safety: Not on file   Housing Stability: Not on file        ROS:  The 10 point review of systems is negative other than noted in the HPI and above.    PE:  Vitals: /68   Pulse 113   Ht 1.753 m (5' 9\")   Wt 59 kg (130 lb)   SpO2 99%   BMI 19.20 kg/m    General appearance: well-nourished, sitting comfortably, no apparent distress  Psych: normal affect, pleasant  HEENT:  Head normocephalic and atraumatic, pupils equal and round, conjunctivae clear, mucous membranes moist, external ears and nose normal  Neck: Supple without thyromegaly or masses  Lungs: Respirations unlabored  Lymphatic: No cervical, or supraclavicular lymphadenopathy  Extremities: Without edema  Musculoskeletal:  Normal station and gait  Neurologic: nonfocal, grossly intact times four extremities, alert and oriented times three  Psychiatric: Mood and affect are appropriate  Skin: Without lesions or rashes    Breast:  A bilateral breast exam was performed in the supine position.. Bilateral breasts were palpated in a circumferential clockwise fashion including the supraclavicular and axillary areas.   Breasts are symmetric.  Nipples are everted and appear normal bilaterally.  Skin is normal.  There is a visible mass in the left upper breast around 12:00.  This is 3 to 3.5 cm in size and well-circumscribed and mobile.  There are no other masses palpable in either breast      Lymph:       No supraclavicular/infraclavicular adenopathy.   Axillary adenopathy: none    Assessment/Plan: Min Anthony is a 24 year old who " presents with an enlarging left breast mass that is clinically consistent with fibroadenoma.  We discussed the small chance it could be a phyllodes tumor and given it is growing I would recommend surgical excision.  We discussed the risks, benefits, indications and alternatives.  This is an easily palpable and visible mass and does not require localization.      30 minutes total time spent on the date of this encounter doing: chart review, review of test results, patient visit, physical exam, education, counseling, developing plan of care, and documenting.    Beverly Lugo MD      Please route or send letter to:  Primary Care Provider (PCP) and Referring Provider

## 2024-09-10 ENCOUNTER — TELEPHONE (OUTPATIENT)
Dept: SURGERY | Facility: CLINIC | Age: 24
End: 2024-09-10
Payer: COMMERCIAL

## 2024-09-10 NOTE — TELEPHONE ENCOUNTER
Type of surgery: excision left breast mass  Location of surgery: [facility]  Date and time of surgery: 10/11/24 9:35am  Surgeon: Dr Lugo  Pre-Op Appt Date: Dr Lugo to update 9/6 note  Post-Op Appt Date: pt to schedule   Packet sent out: Yes  Pre-cert/Authorization completed:  Not Applicable  Date: 9/6/24

## 2024-10-10 ENCOUNTER — ANESTHESIA EVENT (OUTPATIENT)
Dept: SURGERY | Facility: CLINIC | Age: 24
End: 2024-10-10
Payer: COMMERCIAL

## 2024-10-11 ENCOUNTER — HOSPITAL ENCOUNTER (OUTPATIENT)
Facility: CLINIC | Age: 24
Discharge: HOME | End: 2024-10-11
Attending: SURGERY | Admitting: SURGERY
Payer: COMMERCIAL

## 2024-10-11 ENCOUNTER — APPOINTMENT (OUTPATIENT)
Dept: SURGERY | Facility: PHYSICIAN GROUP | Age: 24
End: 2024-10-11
Payer: COMMERCIAL

## 2024-10-11 ENCOUNTER — ANESTHESIA (OUTPATIENT)
Dept: SURGERY | Facility: CLINIC | Age: 24
End: 2024-10-11
Payer: COMMERCIAL

## 2024-10-11 VITALS
OXYGEN SATURATION: 100 % | BODY MASS INDEX: 19.24 KG/M2 | HEART RATE: 55 BPM | HEIGHT: 69 IN | DIASTOLIC BLOOD PRESSURE: 62 MMHG | SYSTOLIC BLOOD PRESSURE: 108 MMHG | RESPIRATION RATE: 10 BRPM | WEIGHT: 129.9 LBS | TEMPERATURE: 98.4 F

## 2024-10-11 DIAGNOSIS — G89.18 ACUTE POST-OPERATIVE PAIN: Primary | ICD-10-CM

## 2024-10-11 LAB — HCG UR QL: NEGATIVE

## 2024-10-11 PROCEDURE — 19120 REMOVAL OF BREAST LESION: CPT | Performed by: STUDENT IN AN ORGANIZED HEALTH CARE EDUCATION/TRAINING PROGRAM

## 2024-10-11 PROCEDURE — 250N000011 HC RX IP 250 OP 636: Performed by: STUDENT IN AN ORGANIZED HEALTH CARE EDUCATION/TRAINING PROGRAM

## 2024-10-11 PROCEDURE — 250N000011 HC RX IP 250 OP 636: Performed by: SURGERY

## 2024-10-11 PROCEDURE — 19120 REMOVAL OF BREAST LESION: CPT | Mod: LT | Performed by: SURGERY

## 2024-10-11 PROCEDURE — 710N000009 HC RECOVERY PHASE 1, LEVEL 1, PER MIN: Performed by: SURGERY

## 2024-10-11 PROCEDURE — 999N000141 HC STATISTIC PRE-PROCEDURE NURSING ASSESSMENT: Performed by: SURGERY

## 2024-10-11 PROCEDURE — 710N000012 HC RECOVERY PHASE 2, PER MINUTE: Performed by: SURGERY

## 2024-10-11 PROCEDURE — 258N000003 HC RX IP 258 OP 636: Performed by: NURSE ANESTHETIST, CERTIFIED REGISTERED

## 2024-10-11 PROCEDURE — 19120 REMOVAL OF BREAST LESION: CPT | Performed by: NURSE ANESTHETIST, CERTIFIED REGISTERED

## 2024-10-11 PROCEDURE — 360N000074 HC SURGERY LEVEL 1, PER MIN: Performed by: SURGERY

## 2024-10-11 PROCEDURE — 88305 TISSUE EXAM BY PATHOLOGIST: CPT | Mod: 26 | Performed by: PATHOLOGY

## 2024-10-11 PROCEDURE — 250N000011 HC RX IP 250 OP 636: Performed by: NURSE ANESTHETIST, CERTIFIED REGISTERED

## 2024-10-11 PROCEDURE — 250N000009 HC RX 250: Performed by: NURSE ANESTHETIST, CERTIFIED REGISTERED

## 2024-10-11 PROCEDURE — 370N000017 HC ANESTHESIA TECHNICAL FEE, PER MIN: Performed by: SURGERY

## 2024-10-11 PROCEDURE — 272N000001 HC OR GENERAL SUPPLY STERILE: Performed by: SURGERY

## 2024-10-11 PROCEDURE — 250N000009 HC RX 250: Performed by: SURGERY

## 2024-10-11 PROCEDURE — 81025 URINE PREGNANCY TEST: CPT | Performed by: ANESTHESIOLOGY

## 2024-10-11 PROCEDURE — 88307 TISSUE EXAM BY PATHOLOGIST: CPT | Mod: TC | Performed by: SURGERY

## 2024-10-11 RX ORDER — SODIUM CHLORIDE, SODIUM LACTATE, POTASSIUM CHLORIDE, CALCIUM CHLORIDE 600; 310; 30; 20 MG/100ML; MG/100ML; MG/100ML; MG/100ML
INJECTION, SOLUTION INTRAVENOUS CONTINUOUS PRN
Status: DISCONTINUED | OUTPATIENT
Start: 2024-10-11 | End: 2024-10-11

## 2024-10-11 RX ORDER — SODIUM CHLORIDE, SODIUM LACTATE, POTASSIUM CHLORIDE, CALCIUM CHLORIDE 600; 310; 30; 20 MG/100ML; MG/100ML; MG/100ML; MG/100ML
INJECTION, SOLUTION INTRAVENOUS CONTINUOUS
Status: DISCONTINUED | OUTPATIENT
Start: 2024-10-11 | End: 2024-10-11 | Stop reason: HOSPADM

## 2024-10-11 RX ORDER — ONDANSETRON 2 MG/ML
INJECTION INTRAMUSCULAR; INTRAVENOUS PRN
Status: DISCONTINUED | OUTPATIENT
Start: 2024-10-11 | End: 2024-10-11

## 2024-10-11 RX ORDER — DEXAMETHASONE SODIUM PHOSPHATE 4 MG/ML
4 INJECTION, SOLUTION INTRA-ARTICULAR; INTRALESIONAL; INTRAMUSCULAR; INTRAVENOUS; SOFT TISSUE
Status: DISCONTINUED | OUTPATIENT
Start: 2024-10-11 | End: 2024-10-11 | Stop reason: HOSPADM

## 2024-10-11 RX ORDER — HYDROCODONE BITARTRATE AND ACETAMINOPHEN 5; 325 MG/1; MG/1
1 TABLET ORAL
Status: DISCONTINUED | OUTPATIENT
Start: 2024-10-11 | End: 2024-10-11 | Stop reason: HOSPADM

## 2024-10-11 RX ORDER — PROPOFOL 10 MG/ML
INJECTION, EMULSION INTRAVENOUS CONTINUOUS PRN
Status: DISCONTINUED | OUTPATIENT
Start: 2024-10-11 | End: 2024-10-11

## 2024-10-11 RX ORDER — MEPERIDINE HYDROCHLORIDE 25 MG/ML
12.5 INJECTION INTRAMUSCULAR; INTRAVENOUS; SUBCUTANEOUS EVERY 5 MIN PRN
Status: DISCONTINUED | OUTPATIENT
Start: 2024-10-11 | End: 2024-10-11 | Stop reason: HOSPADM

## 2024-10-11 RX ORDER — ONDANSETRON 2 MG/ML
4 INJECTION INTRAMUSCULAR; INTRAVENOUS EVERY 30 MIN PRN
Status: DISCONTINUED | OUTPATIENT
Start: 2024-10-11 | End: 2024-10-11 | Stop reason: HOSPADM

## 2024-10-11 RX ORDER — HYDROMORPHONE HCL IN WATER/PF 6 MG/30 ML
0.2 PATIENT CONTROLLED ANALGESIA SYRINGE INTRAVENOUS EVERY 5 MIN PRN
Status: DISCONTINUED | OUTPATIENT
Start: 2024-10-11 | End: 2024-10-11 | Stop reason: HOSPADM

## 2024-10-11 RX ORDER — CEFAZOLIN SODIUM/WATER 2 G/20 ML
2 SYRINGE (ML) INTRAVENOUS
Status: COMPLETED | OUTPATIENT
Start: 2024-10-11 | End: 2024-10-11

## 2024-10-11 RX ORDER — HYDROMORPHONE HCL IN WATER/PF 6 MG/30 ML
0.4 PATIENT CONTROLLED ANALGESIA SYRINGE INTRAVENOUS EVERY 5 MIN PRN
Status: DISCONTINUED | OUTPATIENT
Start: 2024-10-11 | End: 2024-10-11 | Stop reason: HOSPADM

## 2024-10-11 RX ORDER — FENTANYL CITRATE 0.05 MG/ML
50 INJECTION, SOLUTION INTRAMUSCULAR; INTRAVENOUS EVERY 5 MIN PRN
Status: DISCONTINUED | OUTPATIENT
Start: 2024-10-11 | End: 2024-10-11 | Stop reason: HOSPADM

## 2024-10-11 RX ORDER — NALOXONE HYDROCHLORIDE 0.4 MG/ML
0.1 INJECTION, SOLUTION INTRAMUSCULAR; INTRAVENOUS; SUBCUTANEOUS
Status: DISCONTINUED | OUTPATIENT
Start: 2024-10-11 | End: 2024-10-11 | Stop reason: HOSPADM

## 2024-10-11 RX ORDER — FENTANYL CITRATE 0.05 MG/ML
25 INJECTION, SOLUTION INTRAMUSCULAR; INTRAVENOUS EVERY 5 MIN PRN
Status: DISCONTINUED | OUTPATIENT
Start: 2024-10-11 | End: 2024-10-11 | Stop reason: HOSPADM

## 2024-10-11 RX ORDER — ONDANSETRON 4 MG/1
4 TABLET, ORALLY DISINTEGRATING ORAL EVERY 30 MIN PRN
Status: DISCONTINUED | OUTPATIENT
Start: 2024-10-11 | End: 2024-10-11 | Stop reason: HOSPADM

## 2024-10-11 RX ORDER — LIDOCAINE HYDROCHLORIDE 20 MG/ML
INJECTION, SOLUTION INFILTRATION; PERINEURAL PRN
Status: DISCONTINUED | OUTPATIENT
Start: 2024-10-11 | End: 2024-10-11

## 2024-10-11 RX ORDER — MEPERIDINE HYDROCHLORIDE 25 MG/ML
25 INJECTION INTRAMUSCULAR; INTRAVENOUS; SUBCUTANEOUS ONCE
Status: DISCONTINUED | OUTPATIENT
Start: 2024-10-11 | End: 2024-10-11 | Stop reason: HOSPADM

## 2024-10-11 RX ORDER — CEFAZOLIN SODIUM/WATER 2 G/20 ML
2 SYRINGE (ML) INTRAVENOUS SEE ADMIN INSTRUCTIONS
Status: DISCONTINUED | OUTPATIENT
Start: 2024-10-11 | End: 2024-10-11 | Stop reason: HOSPADM

## 2024-10-11 RX ORDER — MAGNESIUM HYDROXIDE 1200 MG/15ML
LIQUID ORAL PRN
Status: DISCONTINUED | OUTPATIENT
Start: 2024-10-11 | End: 2024-10-11 | Stop reason: HOSPADM

## 2024-10-11 RX ORDER — AMOXICILLIN 250 MG
1-2 CAPSULE ORAL 2 TIMES DAILY
Qty: 15 TABLET | Refills: 0 | Status: SHIPPED | OUTPATIENT
Start: 2024-10-11

## 2024-10-11 RX ORDER — HYDROCODONE BITARTRATE AND ACETAMINOPHEN 5; 325 MG/1; MG/1
1 TABLET ORAL EVERY 4 HOURS PRN
Qty: 10 TABLET | Refills: 0 | Status: SHIPPED | OUTPATIENT
Start: 2024-10-11

## 2024-10-11 RX ORDER — FENTANYL CITRATE 50 UG/ML
INJECTION, SOLUTION INTRAMUSCULAR; INTRAVENOUS PRN
Status: DISCONTINUED | OUTPATIENT
Start: 2024-10-11 | End: 2024-10-11

## 2024-10-11 RX ADMIN — FENTANYL CITRATE 100 MCG: 50 INJECTION INTRAMUSCULAR; INTRAVENOUS at 08:58

## 2024-10-11 RX ADMIN — PROPOFOL 150 MCG/KG/MIN: 10 INJECTION, EMULSION INTRAVENOUS at 09:01

## 2024-10-11 RX ADMIN — Medication 2 G: at 08:56

## 2024-10-11 RX ADMIN — SODIUM CHLORIDE, POTASSIUM CHLORIDE, SODIUM LACTATE AND CALCIUM CHLORIDE: 600; 310; 30; 20 INJECTION, SOLUTION INTRAVENOUS at 08:56

## 2024-10-11 RX ADMIN — MEPERIDINE HYDROCHLORIDE 12.5 MG: 25 INJECTION INTRAMUSCULAR; INTRAVENOUS; SUBCUTANEOUS at 09:56

## 2024-10-11 RX ADMIN — LIDOCAINE HYDROCHLORIDE 60 MG: 20 INJECTION, SOLUTION INFILTRATION; PERINEURAL at 09:01

## 2024-10-11 RX ADMIN — MIDAZOLAM 2 MG: 1 INJECTION INTRAMUSCULAR; INTRAVENOUS at 08:54

## 2024-10-11 RX ADMIN — ONDANSETRON 4 MG: 2 INJECTION INTRAMUSCULAR; INTRAVENOUS at 09:27

## 2024-10-11 ASSESSMENT — ACTIVITIES OF DAILY LIVING (ADL)
ADLS_ACUITY_SCORE: 35

## 2024-10-11 NOTE — ANESTHESIA PREPROCEDURE EVALUATION
"Anesthesia Pre-Procedure Evaluation    Patient: Min Anthony   MRN: [id number] : 2000        Procedure : Procedure(s):  excision left breast mass          Past Medical History:   Diagnosis Date    Mass of upper inner quadrant of left breast       Past Surgical History:   Procedure Laterality Date    OPEN REDUCTION INTERNAL FIXATION CLAVICLE Right 2022    Procedure: OPEN REDUCTION INTERNAL FIXATION, FRACTURE, CLAVICLE RIGHT;  Surgeon: Amada Luna MD;  Location: [facility] OR    wisdom teeth        No Known Allergies   Social History     Tobacco Use    Smoking status: Never    Smokeless tobacco: Never   Substance Use Topics    Alcohol use: Yes     Comment: socially      Wt Readings from Last 1 Encounters:   10/11/24 58.9 kg (129 lb 14.4 oz)        Anesthesia Evaluation   Pt has had prior anesthetic. Type: General.    No history of anesthetic complications       ROS/MED HX  ENT/Pulmonary:  - neg pulmonary ROS     Neurologic:  - neg neurologic ROS     Cardiovascular:       METS/Exercise Tolerance: >4 METS    Hematologic:  - neg hematologic  ROS     Musculoskeletal:  - neg musculoskeletal ROS     GI/Hepatic:  - neg GI/hepatic ROS     Renal/Genitourinary:  - neg Renal ROS     Endo:  - neg endo ROS     Psychiatric/Substance Use:  - neg psychiatric ROS     Infectious Disease:  - neg infectious disease ROS     Malignancy: Comment: Breast mass      Other:  - neg other ROS          Physical Exam    Airway        Mallampati: I   TM distance: > 3 FB   Neck ROM: full   Mouth opening: > 3 cm    Respiratory Devices and Support         Dental       (+) Completely normal teeth      Cardiovascular   cardiovascular exam normal       Rhythm and rate: regular and normal     Pulmonary   pulmonary exam normal        breath sounds clear to auscultation           OUTSIDE LABS:  CBC: No results found for: \"WBC\", \"HGB\", \"HCT\", \"PLT\"  BMP: No results found for: \"NA\", \"POTASSIUM\", \"CHLORIDE\", \"CO2\", \"BUN\", \"CR\", " "\"GLC\"  COAGS: No results found for: \"PTT\", \"INR\", \"FIBR\"  POC:   Lab Results   Component Value Date    HCG Negative 10/11/2024     HEPATIC: No results found for: \"ALBUMIN\", \"PROTTOTAL\", \"ALT\", \"AST\", \"GGT\", \"ALKPHOS\", \"BILITOTAL\", \"BILIDIRECT\", \"CECILIA\"  OTHER: No results found for: \"PH\", \"LACT\", \"A1C\", \"JODI\", \"PHOS\", \"MAG\", \"LIPASE\", \"AMYLASE\", \"TSH\", \"T4\", \"T3\", \"CRP\", \"SED\"    Anesthesia Plan    ASA Status:  1    NPO Status:  NPO Appropriate    Anesthesia Type: MAC.     - Reason for MAC: straight local not clinically adequate              Consents            Postoperative Care    Pain management: Multi-modal analgesia.   PONV prophylaxis: Background Propofol Infusion     Comments:               Imani Amaral MD    I have reviewed the pertinent notes and labs in the chart from the past 30 days and (re)examined the patient.  Any updates or changes from those notes are reflected in this note.                                 "

## 2024-10-11 NOTE — ANESTHESIA POSTPROCEDURE EVALUATION
Patient: Min Anthony    Procedure: Procedure(s):  excision left breast mass       Anesthesia Type:  MAC    Note:  Disposition: Outpatient   Postop Pain Control: Uneventful            Sign Out: Well controlled pain   PONV: No   Neuro/Psych: Uneventful            Sign Out: Acceptable/Baseline neuro status   Airway/Respiratory: Uneventful            Sign Out: Acceptable/Baseline resp. status   CV/Hemodynamics: Uneventful            Sign Out: Acceptable CV status; No obvious hypovolemia; No obvious fluid overload   Other NRE: NONE   DID A NON-ROUTINE EVENT OCCUR? No           Last vitals:  Vitals Value Taken Time   /62 10/11/24 1020   Temp 36.9  C (98.4  F) 10/11/24 1015   Pulse 63 10/11/24 1020   Resp 11 10/11/24 1019   SpO2 100 % 10/11/24 1019   Vitals shown include unfiled device data.    Electronically Signed By: Imani Amaral MD  October 11, 2024  11:35 AM

## 2024-10-11 NOTE — OP NOTE
[facility] Breast Surgery Operative Note      Pre-operative diagnosis: Left breast mass   Post-operative diagnosis: Same, pathology pending     Procedure: 1.  Left breast excisional biopsy     Surgeon: Beverly Lugo MD   Assistant(s):  Shahnaz Minor PA-C  The PA s assistance was medically necessary to provide adequate exposure in the operating field, maintain hemostasis, cutting suture, clamping and ligating bleeding vessels, and visualization of anatomic structures throughout the surgical procedure.      Anesthesia: Local with MAC    Estimated blood loss:   5 cc     Specimens: ID Type Source Tests Collected by Time Destination   1 : left breast Tissue Breast, Left SURGICAL PATHOLOGY EXAM Beverly Lugo MD 10/11/2024  9:18 AM         INDICATION: Please see my clinic notes for details.   DESCRIPTION OF PROCEDURE: The patient was placed on the table in supine position. Conscious sedation was induced. Perioperative antibiotics were given.  The left breast and axilla were prepped and draped in standard sterile fashion.  We made a periareolar incision centered at the 12 o'clock position. We carried the incision down using electrocautery into the breast tissue and excised the mass. The mass was removed in its entirety. The mass was completely removed and had a defined capsule surrounding it.  It was inked on all margins. The specimen was then sent to pathology for review.  The wound was then examined for bleeding and hemostasis was achieved using electrocautery.    The biopsy cavity was reapproximated with several interrupted 2-0 vicryl sutures. The skin was closed with a deep dermal 3-0 vicryl and running 4-0 Monocryl subcuticular suture and steri strips.  The patient tolerated the procedure well.  Sponge and instrument counts were correct.    Beverly Lugo MD  [facility]  [phone]

## 2024-10-11 NOTE — ANESTHESIA CARE TRANSFER NOTE
Patient: Min Anthony    Procedure: Procedure(s):  excision left breast mass       Diagnosis: Mass of upper inner quadrant of left breast [N63.22]  Diagnosis Additional Information: No value filed.    Anesthesia Type:   MAC     Note:    Oropharynx: oropharynx clear of all foreign objects and spontaneously breathing  Level of Consciousness: awake  Oxygen Supplementation: room air    Independent Airway: airway patency satisfactory and stable  Dentition: dentition unchanged  Vital Signs Stable: post-procedure vital signs reviewed and stable  Report to RN Given: handoff report given  Patient transferred to: PACU    Handoff Report: Identifed the Patient, Identified the Reponsible Provider, Reviewed the pertinent medical history, Discussed the surgical course, Reviewed Intra-OP anesthesia mangement and issues during anesthesia, Set expectations for post-procedure period and Allowed opportunity for questions and acknowledgement of understanding      Vitals:  Vitals Value Taken Time   BP     Temp     Pulse     Resp     SpO2         Electronically Signed By: GABI Jorge CRNA  October 11, 2024  9:47 AM

## 2024-10-11 NOTE — DISCHARGE INSTRUCTIONS
Same Day Surgery Discharge Instructions for  Sedation and General Anesthesia     It's not unusual to feel dizzy, light-headed or faint for up to 24 hours after surgery or while taking pain medication.  If you have these symptoms: sit for a few minutes before standing and have someone assist you when you get up to walk or use the bathroom.    You should rest and relax for the next 24 hours. We recommend you make arrangements to have an adult stay with you for at least 24 hours after your discharge.  Avoid hazardous and strenuous activity.    DO NOT DRIVE any vehicle or operate mechanical equipment for 24 hours following the end of your surgery.  Even though you may feel normal, your reactions may be affected by the medication you have received.    Do not drink alcoholic beverages for 24 hours following surgery.     Slowly progress to your regular diet as you feel able. It's not unusual to feel nauseated and/or vomit after receiving anesthesia.  If you develop these symptoms, drink clear liquids (apple juice, ginger ale, broth, 7-up, etc. ) until you feel better.  If your nausea and vomiting persists for 24 hours, please notify your surgeon.      All narcotic pain medications, along with inactivity and anesthesia, can cause constipation. Drinking plenty of liquids and increasing fiber intake will help.    For any questions of a medical nature, call your surgeon.    Do not make important decisions for 24 hours.    If you had general anesthesia, you may have a sore throat for a couple of days related to the breathing tube used during surgery.  You may use Cepacol lozenges to help with this discomfort.  If it worsens or if you develop a fever, contact your surgeon.     If you feel your pain is not well managed with the pain medications prescribed by your surgeon, please contact your surgeon's office to let them know so they can address your concerns.      **If you have concerns or questions about your procedure,     please contact Dr Lugo at  [phone]**    [facility]  Discharge Instructions: Post-Operative Breast Surgery    ACTIVITY  Take frequent short walks and increase your activity gradually.    Avoid strenuous physical activity or heavy lifting greater than 15-20 lbs. for 1-2 weeks with arm on the surgery side.  You may climb stairs.  Gentle rotation and stretching of your arms and shoulders will prevent joint stiffness.  You may drive without restrictions when you are not using any prescription pain medication and feel comfortable in a car.  You may return to work/school when you are comfortable without any prescription pain medication.    WOUND CARE  You may remove your ACE wrap/dressing and shower 48 hours after the surgery.  Pat your incisions dry and leave them open to air.  Re-apply dressing (Band-Aids or gauze/tape) as needed for drainage.  You may have steri-strips (looks like white tape) or skin glue on your incisions.  You may peel off the steri-strips 2 weeks after your surgery if they have not peeled off on their own.  If you have skin glue, it will peel up and fall off on its own.  Do not soak your incisions in a tub or pool for 2 weeks.   Do not apply any lotions, creams, or ointments to your incisions.  A ridge under your incisions is normal and will gradually resolve.  Wear a supportive bra for 1-2 weeks, day and night.    DIET  Start with liquids, then gradually resume your regular diet as tolerated.   Drink plenty of liquids to stay hydrated.    PAIN  Expect some tenderness and discomfort at the incision site(s).  Use the prescribed pain medication at your discretion.  Expect gradual resolution of your pain over several days.  You may take ibuprofen with food (unless you have been told not to) or acetaminophen/Tylenol instead of or in addition to your prescribed pain medication.  However, if you are taking Norco or Percocet, do not take any additional  acetaminophen/Tylenol.  Do not drink alcohol or drive while you are taking pain medications.    EXPECTATIONS  Pain medications can cause constipation.  Limit use when possible.  Take an over the counter or prescribed stool softener/stimulant, such as Colace or Senna, 1-2 times a day with plenty of water.  You may take a mild over the counter laxative, such as Miralax or a suppository, as needed.    You may discontinue these medications once you are having regular bowel movements and/or are no longer taking your narcotic pain medication.    RETURN APPOINTMENT  Follow up with your surgeon in 2-4 weeks.  Please call the office at [phone] to schedule your appointment.      CALL OUR OFFICE -[phone] IF YOU HAVE:   Chills or fever above 101 F.  Increased redness, warmth, or drainage at your incisions.  Significant bleeding.  Pain not relieved by your pain medication or rest.  Increasing pain after the first 48 hours.  Any other concerns or questions.             Revised October 2022    **If you have concerns or questions about your procedure,    please contact Dr Lugo at  [phone]**

## 2024-10-14 LAB
PATH REPORT.COMMENTS IMP SPEC: NORMAL
PATH REPORT.COMMENTS IMP SPEC: NORMAL
PATH REPORT.FINAL DX SPEC: NORMAL
PATH REPORT.GROSS SPEC: NORMAL
PATH REPORT.MICROSCOPIC SPEC OTHER STN: NORMAL
PATH REPORT.RELEVANT HX SPEC: NORMAL
PHOTO IMAGE: NORMAL

## 2024-10-16 NOTE — H&P
"[facility] General Surgery Consultation    Min Anthony MRN# [id number]   YOB: 2000 Age: 24 year old      Date of Admission:  10/11/2024  Date of Consult: 10/16/2024         Assessment and Plan:   Patient is a 24 year old female with a left breast mass. This note is to serve as preoperative H&P. No updates and pt low risk for procedure.     PLAN:  Proceed with excision of left breast mass         Requesting Physician:      none        Chief Complaint:   No chief complaint on file.         History of Present Illness:   Min Anthony is a 24 year old female who was seen in consultation at the request of Dr. Enciso who presented with a left breast mass. She is otherwise healthy.            Physical Exam:   Blood pressure 108/62, pulse 55, temperature 98.4  F (36.9  C), resp. rate 10, height 1.753 m (5' 9\"), weight 58.9 kg (129 lb 14.4 oz), SpO2 100%.  129 lbs 14.4 oz  General: no distress, healthy young adult  Psych: Alert and Oriented.  Normal affect  Neurological: grossly intact  Eyes: Sclera clear  Respiratory:  nonlabored breathing, CTAB  Cardiovascular:  Regular Rate and Rhythm, No murmurs  GI: soft, nt   Lymphatic/Hematologic/Immune:  No femoral or cervical lymphadenopathy.  Integumentary:  No rashes         Past Medical History:     Past Medical History:   Diagnosis Date    Mass of upper inner quadrant of left breast 2019            Past Surgical History:     Past Surgical History:   Procedure Laterality Date    EXCISE BREAST CYST/FIBROADENOMA/TUMOR/DUCT LESION/NIPPLE LESION/AREOLAR LESION Left 10/11/2024    Procedure: excision left breast mass;  Surgeon: Beverly Lugo MD;  Location:  OR    OPEN REDUCTION INTERNAL FIXATION CLAVICLE Right 04/12/2022    Procedure: OPEN REDUCTION INTERNAL FIXATION, FRACTURE, CLAVICLE RIGHT;  Surgeon: Amada Luna MD;  [place]: [facility] [place]    wisdom teeth              Current Medications:         No current facility-administered " medications for this encounter.       No current facility-administered medications for this encounter.            Home Medications:     Prior to Admission medications    Medication Sig Last Dose Taking? Auth Provider Long Term End Date   HYDROcodone-acetaminophen (NORCO) 5-325 MG tablet Take 1 tablet by mouth every 4 hours as needed for moderate to severe pain.  Yes Shahnaz Minor PA-C     senna-docusate (SENOKOT-S/PERICOLACE) 8.6-50 MG tablet Take 1-2 tablets by mouth 2 times daily.  Yes Shahnaz Minor PA-C No             Allergies:   No Known Allergies         Family History:   History reviewed. No pertinent family history.        Social History:   Min Anthony  reports that she has never smoked. She has never used smokeless tobacco. She reports current alcohol use. She reports that she does not currently use drugs after having used the following drugs: Marijuana.          Review of Systems:   The 10 point Review of Systems is negative other than noted in the HPI.         Labs/Imaging   All new lab and imaging data was reviewed.   I have personally reviewed the imaging studies including her US.         Beverly Lugo MD

## 2024-11-01 ENCOUNTER — OFFICE VISIT (OUTPATIENT)
Dept: SURGERY | Facility: CLINIC | Age: 24
End: 2024-11-01
Payer: COMMERCIAL

## 2024-11-01 DIAGNOSIS — Z09 SURGICAL FOLLOW-UP CARE: Primary | ICD-10-CM

## 2024-11-01 PROCEDURE — 99024 POSTOP FOLLOW-UP VISIT: CPT | Performed by: SURGERY

## 2024-11-01 NOTE — PROGRESS NOTES
[facility] Breast Surgery Postoperative Note    S: Smooth reports she is healing really well.  She took Advil once after surgery and has not needed anything further.  We reviewed her image under the media tab which I had taken during her procedure.    Breasts: Left breast incision is healing well.  No erythema or induration    Pathology: Reviewed and copy given to patient    A/P  Min Anthony is recovering from a excision of a left breast mass on 10/11/2024.  Her pathology revealed a benign fibroadenoma measuring 3.4 cm, completely excised.  She is recovering well after surgery.  She will follow-up with me as needed in the future.    Thank you for the opportunity to help in her care.    Beverly Lugo MD  [facility]  [phone]    Please route or send letter to:  Primary Care Provider (PCP) and Referring Provider

## 2025-07-12 ENCOUNTER — HEALTH MAINTENANCE LETTER (OUTPATIENT)
Age: 25
End: 2025-07-12

## (undated) DEVICE — GLOVE BIOGEL PI MICRO SZ 6.0 48560

## (undated) DEVICE — PREP CHLORAPREP 26ML TINTED HI-LITE ORANGE 930815

## (undated) DEVICE — COVER CAMERA IN-LIGHT DISP LT-C02

## (undated) DEVICE — SU VICRYL 2-0 SH 27" J317H

## (undated) DEVICE — LINEN TOWEL PACK X5 5464

## (undated) DEVICE — NDL 19GA 1.5"

## (undated) DEVICE — ESU PENCIL W/SMOKE EVAC NEPTUNE STRYKER 0703-046-000

## (undated) DEVICE — SU VICRYL 3-0 SH 27" J316H

## (undated) DEVICE — IMP WIRE KIRSCHNER STRK 1.6MM 150MM 390164
Type: IMPLANTABLE DEVICE | Site: CLAVICLE | Status: NON-FUNCTIONAL
Removed: 2022-04-12

## (undated) DEVICE — ESU GROUND PAD ADULT W/CORD E7507

## (undated) DEVICE — DRSG STERI STRIP 1/2X4" R1547

## (undated) DEVICE — DRILL BIT AO 2.0X135MM SCALED

## (undated) DEVICE — BRUSH SURGICAL SCRUB W/4% CHG SOL 25ML 371073

## (undated) DEVICE — SU VICRYL 0 CT-1 27" UND J260H

## (undated) DEVICE — ESU ELEC BLADE 2.75" COATED/INSULATED E1455

## (undated) DEVICE — Device

## (undated) DEVICE — STRAP STIRRUP W/SLIP 30187-030

## (undated) DEVICE — GLOVE BIOGEL PI SZ 7.5 40875

## (undated) DEVICE — SU MONOCRYL 2-0 SH 27" UND Y417H

## (undated) DEVICE — SU MONOCRYL 4-0 PS-2 18" UND Y496G

## (undated) DEVICE — SU SILK 2-0 FSL 18" 677G

## (undated) DEVICE — PREP DURAPREP 26ML APL 8630

## (undated) DEVICE — DRAPE BREAST/CHEST 29420

## (undated) DEVICE — SOL NACL 0.9% IRRIG 500ML BOTTLE 2F7123

## (undated) DEVICE — DRAPE U-POUCH 34X29" 1067

## (undated) DEVICE — SOL WATER IRRIG 1000ML BOTTLE 2F7114

## (undated) DEVICE — DECANTER VIAL 2006S

## (undated) DEVICE — BLADE KNIFE SURG 10 371110

## (undated) DEVICE — DRAPE C-ARM OEC MINI VIEW 6800   00-901917-01

## (undated) DEVICE — DECANTER BAG 2002S

## (undated) DEVICE — GLOVE BIOGEL PI MICRO INDICATOR UNDERGLOVE SZ 6.5 48965

## (undated) DEVICE — ESU CLEANER TIP 31142717

## (undated) DEVICE — SYR BULB IRRIG 50ML LATEX FREE 0035280

## (undated) DEVICE — DRILL BIT 2.6X135MM SCALED 703

## (undated) DEVICE — SU SILK 2-0 FS-1 18" 685G

## (undated) DEVICE — BLADE KNIFE SURG 15 371115

## (undated) DEVICE — SPONGE LAP 18X18" X8435

## (undated) DEVICE — ESU GROUND PAD UNIVERSAL W/O CORD

## (undated) DEVICE — GLOVE BIOGEL PI MICRO SZ 6.5 48565

## (undated) DEVICE — PACK SHOULDER CUSTOM ASC SOP15ASUMA

## (undated) DEVICE — GOWN IMPERVIOUS BREATHABLE SMART LG 89015

## (undated) DEVICE — ESU ELEC NDL 1" E1552

## (undated) DEVICE — DRAPE STERI U 1015

## (undated) DEVICE — DRAPE SHOULDER PACK SPLITS 29365

## (undated) DEVICE — LINEN ORTHO PACK 5446

## (undated) DEVICE — SYR BULB IRRIG DOVER 60 ML LATEX FREE 67000

## (undated) DEVICE — ESU PENCIL SMOKE EVAC W/ROCKER SWITCH 0703-047-000

## (undated) DEVICE — PAD CHUX UNDERPAD 23X24" 7136

## (undated) DEVICE — SU MONOCRYL 3-0 PS-2 18" UND Y497G

## (undated) DEVICE — GLOVE PROTEXIS POWDER FREE SMT 7.0  2D72PT70X

## (undated) DEVICE — PACK MINOR SBA15MIFSE

## (undated) DEVICE — SUCTION MANIFOLD NEPTUNE 2 SYS 1 PORT 702-025-000

## (undated) RX ORDER — MEPERIDINE HYDROCHLORIDE 25 MG/ML
INJECTION INTRAMUSCULAR; INTRAVENOUS; SUBCUTANEOUS
Status: DISPENSED
Start: 2024-10-11

## (undated) RX ORDER — CEFAZOLIN SODIUM 1 G/3ML
INJECTION, POWDER, FOR SOLUTION INTRAMUSCULAR; INTRAVENOUS
Status: DISPENSED
Start: 2022-04-12

## (undated) RX ORDER — DEXAMETHASONE SODIUM PHOSPHATE 4 MG/ML
INJECTION, SOLUTION INTRA-ARTICULAR; INTRALESIONAL; INTRAMUSCULAR; INTRAVENOUS; SOFT TISSUE
Status: DISPENSED
Start: 2022-04-12

## (undated) RX ORDER — CEFAZOLIN SODIUM/WATER 2 G/20 ML
SYRINGE (ML) INTRAVENOUS
Status: DISPENSED
Start: 2024-10-11

## (undated) RX ORDER — LIDOCAINE HYDROCHLORIDE 20 MG/ML
INJECTION, SOLUTION EPIDURAL; INFILTRATION; INTRACAUDAL; PERINEURAL
Status: DISPENSED
Start: 2022-04-12

## (undated) RX ORDER — LIDOCAINE HYDROCHLORIDE 10 MG/ML
INJECTION, SOLUTION INFILTRATION; PERINEURAL
Status: DISPENSED
Start: 2024-10-11

## (undated) RX ORDER — FENTANYL CITRATE 50 UG/ML
INJECTION, SOLUTION INTRAMUSCULAR; INTRAVENOUS
Status: DISPENSED
Start: 2022-04-12

## (undated) RX ORDER — PROPOFOL 10 MG/ML
INJECTION, EMULSION INTRAVENOUS
Status: DISPENSED
Start: 2022-04-12

## (undated) RX ORDER — GLYCOPYRROLATE 0.2 MG/ML
INJECTION INTRAMUSCULAR; INTRAVENOUS
Status: DISPENSED
Start: 2022-04-12

## (undated) RX ORDER — BUPIVACAINE HYDROCHLORIDE 2.5 MG/ML
INJECTION, SOLUTION EPIDURAL; INFILTRATION; INTRACAUDAL
Status: DISPENSED
Start: 2024-10-11

## (undated) RX ORDER — ONDANSETRON 2 MG/ML
INJECTION INTRAMUSCULAR; INTRAVENOUS
Status: DISPENSED
Start: 2022-04-12

## (undated) RX ORDER — FENTANYL CITRATE 50 UG/ML
INJECTION, SOLUTION INTRAMUSCULAR; INTRAVENOUS
Status: DISPENSED
Start: 2024-10-11